# Patient Record
Sex: FEMALE | Race: WHITE | Employment: STUDENT | URBAN - METROPOLITAN AREA
[De-identification: names, ages, dates, MRNs, and addresses within clinical notes are randomized per-mention and may not be internally consistent; named-entity substitution may affect disease eponyms.]

---
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
## 2019-12-18 PROBLEM — S73.192A TEAR OF LEFT ACETABULAR LABRUM: Status: ACTIVE | Noted: 2019-12-18

## 2019-12-23 PROBLEM — R87.610 ATYPICAL SQUAMOUS CELL CHANGES OF UNDETERMINED SIGNIFICANCE (ASCUS) ON CERVICAL CYTOLOGY WITH NEGATIVE HIGH RISK HUMAN PAPILLOMA VIRUS (HPV) TEST RESULT: Status: ACTIVE | Noted: 2019-12-23

## 2020-01-20 ENCOUNTER — APPOINTMENT (OUTPATIENT)
Dept: GENERAL RADIOLOGY | Age: 20
End: 2020-01-20
Attending: FAMILY MEDICINE
Payer: COMMERCIAL

## 2020-01-20 ENCOUNTER — HOSPITAL ENCOUNTER (OUTPATIENT)
Age: 20
Discharge: HOME | End: 2020-01-20
Attending: FAMILY MEDICINE
Payer: COMMERCIAL

## 2020-01-20 VITALS
HEART RATE: 91 BPM | TEMPERATURE: 99 F | OXYGEN SATURATION: 98 % | DIASTOLIC BLOOD PRESSURE: 86 MMHG | RESPIRATION RATE: 16 BRPM | SYSTOLIC BLOOD PRESSURE: 116 MMHG

## 2020-01-20 DIAGNOSIS — S93.602A SPRAIN OF LEFT FOOT, INITIAL ENCOUNTER: Primary | ICD-10-CM

## 2020-01-20 PROCEDURE — 99203 OFFICE O/P NEW LOW 30 MIN: CPT

## 2020-01-20 PROCEDURE — 73630 X-RAY EXAM OF FOOT: CPT | Performed by: FAMILY MEDICINE

## 2020-01-20 PROCEDURE — 99213 OFFICE O/P EST LOW 20 MIN: CPT

## 2020-01-20 NOTE — ED INITIAL ASSESSMENT (HPI)
Pt states at practice today running felt a pull in her outer left foot. States it felt better so she ran again and had sudden severe pain to left foot. Arrived ambulating in a CAM boot that she had for the right foot last year. No swelling noted.

## 2020-01-21 NOTE — ED PROVIDER NOTES
Patient Seen in: [id number] [facility]      History   Patient presents with:   Foot Injury    Stated Complaint: LEFT FOOT INJURY     HPI    24-year-old female presents today for evaluation of left foot injury that she sustained today while runni No proximal tib-fib tenderness. Normal knee flexion and extension.     ED Course   Labs Reviewed - No data to display       Xr Foot, Complete (min 3 Views), Left (cpt=73630)    Result Date: 1/20/2020  PROCEDURE:  XR FOOT, COMPLETE (MIN 3 VIEWS), LEFT (CPT

## 2021-07-28 ENCOUNTER — OFFICE VISIT (OUTPATIENT)
Dept: INTERNAL MEDICINE | Age: 21
End: 2021-07-28

## 2021-07-28 ENCOUNTER — LAB SERVICES (OUTPATIENT)
Dept: LAB | Age: 21
End: 2021-07-28

## 2021-07-28 VITALS
SYSTOLIC BLOOD PRESSURE: 102 MMHG | DIASTOLIC BLOOD PRESSURE: 64 MMHG | HEIGHT: 62 IN | TEMPERATURE: 97.4 F | HEART RATE: 76 BPM | RESPIRATION RATE: 16 BRPM | OXYGEN SATURATION: 99 % | BODY MASS INDEX: 28.34 KG/M2 | WEIGHT: 154 LBS

## 2021-07-28 DIAGNOSIS — D17.20 LIPOMA OF UPPER EXTREMITY, UNSPECIFIED LATERALITY: ICD-10-CM

## 2021-07-28 DIAGNOSIS — R63.5 WEIGHT GAIN: ICD-10-CM

## 2021-07-28 DIAGNOSIS — R14.0 ABDOMINAL BLOATING: ICD-10-CM

## 2021-07-28 DIAGNOSIS — K52.9 CHRONIC DIARRHEA: Primary | ICD-10-CM

## 2021-07-28 DIAGNOSIS — K52.9 CHRONIC DIARRHEA: ICD-10-CM

## 2021-07-28 PROBLEM — S73.192A TEAR OF LEFT ACETABULAR LABRUM: Status: ACTIVE | Noted: 2019-12-18

## 2021-07-28 PROBLEM — R87.610 ATYPICAL SQUAMOUS CELL CHANGES OF UNDETERMINED SIGNIFICANCE (ASCUS) ON CERVICAL CYTOLOGY WITH NEGATIVE HIGH RISK HUMAN PAPILLOMA VIRUS (HPV) TEST RESULT: Status: ACTIVE | Noted: 2019-12-23

## 2021-07-28 LAB
ALBUMIN SERPL-MCNC: 4.2 G/DL (ref 3.6–5.1)
ALP SERPL-CCNC: 63 U/L (ref 45–130)
ALT SERPL W/O P-5'-P-CCNC: 18 U/L (ref 4–38)
AST SERPL-CCNC: 28 U/L (ref 14–43)
BASOPHIL %: 0.7 % (ref 0–1.2)
BASOPHIL ABSOLUTE #: 0.1 10*3/UL (ref 0–0.1)
BILIRUB SERPL-MCNC: 0.5 MG/DL (ref 0–1.3)
BUN SERPL-MCNC: 16 MG/DL (ref 7–20)
CALCIUM SERPL-MCNC: 9.5 MG/DL (ref 8.6–10.6)
CHLORIDE SERPL-SCNC: 105 MMOL/L (ref 96–107)
CO2 SERPL-SCNC: 26 MMOL/L (ref 22–32)
CREAT SERPL-MCNC: 0.7 MG/DL (ref 0.5–1.4)
DIFFERENTIAL TYPE: NORMAL
EOSINOPHIL %: 1.3 % (ref 0–10)
EOSINOPHIL ABSOLUTE #: 0.1 10*3/UL (ref 0–0.5)
GFR SERPL CREATININE-BSD FRML MDRD: >60 ML/MIN/{1.73M2}
GFR SERPL CREATININE-BSD FRML MDRD: >60 ML/MIN/{1.73M2}
GLUCOSE SERPL-MCNC: 83 MG/DL (ref 70–200)
HEMATOCRIT: 38.3 % (ref 34–45)
HEMOGLOBIN: 12.3 G/DL (ref 11.2–15.7)
IMMATURE GRANULOCYTE ABSOLUTE: 0.02 10*3/UL (ref 0–0.05)
IMMATURE GRANULOCYTE PERCENT: 0.3 % (ref 0–0.5)
LIPASE SERPL-CCNC: 140 U/L (ref 10–250)
LYMPH PERCENT: 29.3 % (ref 20.5–51.1)
LYMPHOCYTE ABSOLUTE #: 2.1 10*3/UL (ref 1.2–3.4)
MEAN CORPUSCULAR HGB CONCENTRATION: 32.1 % (ref 32–36)
MEAN CORPUSCULAR HGB: 30.1 PG (ref 27–34)
MEAN CORPUSCULAR VOLUME: 93.9 FL (ref 79–95)
MEAN PLATELET VOLUME: 10.1 FL (ref 8.6–12.4)
MONOCYTE ABSOLUTE #: 0.6 10*3/UL (ref 0.2–0.9)
MONOCYTE PERCENT: 7.8 % (ref 4.3–12.9)
NEUTROPHIL ABSOLUTE #: 4.4 10*3/UL (ref 1.4–6.5)
NEUTROPHIL PERCENT: 60.6 % (ref 34–73.5)
PLATELET COUNT: 316 10*3/UL (ref 150–400)
POTASSIUM SERPL-SCNC: 4.1 MMOL/L (ref 3.5–5.3)
PROT SERPL-MCNC: 7 G/DL (ref 6.4–8.5)
RED BLOOD CELL COUNT: 4.08 10*6/UL (ref 3.7–5.2)
RED CELL DISTRIBUTION WIDTH: 12.1 % (ref 11.3–14.8)
SODIUM SERPL-SCNC: 139 MMOL/L (ref 136–146)
T4 FREE SERPL-MCNC: 0.95 NG/DL (ref 0.78–2.19)
TSH SERPL DL<=0.05 MIU/L-ACNC: 2.67 M[IU]/L (ref 0.3–4.82)
WHITE BLOOD CELL COUNT: 7.2 10*3/UL (ref 4–10)

## 2021-07-28 PROCEDURE — 84439 ASSAY OF FREE THYROXINE: CPT | Performed by: INTERNAL MEDICINE

## 2021-07-28 PROCEDURE — 36415 COLL VENOUS BLD VENIPUNCTURE: CPT | Performed by: INTERNAL MEDICINE

## 2021-07-28 PROCEDURE — 99204 OFFICE O/P NEW MOD 45 MIN: CPT | Performed by: INTERNAL MEDICINE

## 2021-07-28 PROCEDURE — 85025 COMPLETE CBC W/AUTO DIFF WBC: CPT | Performed by: INTERNAL MEDICINE

## 2021-07-28 PROCEDURE — 83516 IMMUNOASSAY NONANTIBODY: CPT | Performed by: INTERNAL MEDICINE

## 2021-07-28 PROCEDURE — 83690 ASSAY OF LIPASE: CPT | Performed by: INTERNAL MEDICINE

## 2021-07-28 PROCEDURE — 80053 COMPREHEN METABOLIC PANEL: CPT | Performed by: INTERNAL MEDICINE

## 2021-07-28 PROCEDURE — 84443 ASSAY THYROID STIM HORMONE: CPT | Performed by: INTERNAL MEDICINE

## 2021-07-28 ASSESSMENT — PATIENT HEALTH QUESTIONNAIRE - PHQ9
CLINICAL INTERPRETATION OF PHQ2 SCORE: NO FURTHER SCREENING NEEDED
2. FEELING DOWN, DEPRESSED OR HOPELESS: NOT AT ALL
CLINICAL INTERPRETATION OF PHQ9 SCORE: NO FURTHER SCREENING NEEDED
SUM OF ALL RESPONSES TO PHQ9 QUESTIONS 1 AND 2: 0
1. LITTLE INTEREST OR PLEASURE IN DOING THINGS: NOT AT ALL
SUM OF ALL RESPONSES TO PHQ9 QUESTIONS 1 AND 2: 0

## 2021-07-28 ASSESSMENT — PAIN SCALES - GENERAL: PAINLEVEL: 3

## 2021-07-29 LAB
GLIADIN PEPTIDE IGA SER IA-ACNC: 1.3 U/ML (ref 0–7)
GLIADIN PEPTIDE IGG SER IA-ACNC: 0.6 U/ML (ref 0–7)
TTG IGA SER IA-ACNC: 0.3 U/ML (ref 0–7)
TTG IGG SER IA-ACNC: <0.6 U/ML (ref 0–7)

## 2021-07-30 ENCOUNTER — APPOINTMENT (OUTPATIENT)
Dept: FAMILY MEDICINE | Age: 21
End: 2021-07-30

## 2021-07-31 ENCOUNTER — LAB SERVICES (OUTPATIENT)
Dept: LAB | Age: 21
End: 2021-07-31

## 2021-07-31 DIAGNOSIS — R63.5 WEIGHT GAIN: ICD-10-CM

## 2021-07-31 DIAGNOSIS — K52.9 CHRONIC DIARRHEA: ICD-10-CM

## 2021-07-31 DIAGNOSIS — R14.0 ABDOMINAL BLOATING: ICD-10-CM

## 2021-07-31 PROCEDURE — 87338 HPYLORI STOOL AG IA: CPT | Performed by: INTERNAL MEDICINE

## 2021-08-02 LAB — H PYLORI STOOL ANTIGEN: NEGATIVE

## 2021-08-16 ENCOUNTER — IMAGING SERVICES (OUTPATIENT)
Dept: GENERAL RADIOLOGY | Age: 21
End: 2021-08-16
Attending: INTERNAL MEDICINE

## 2021-08-16 ENCOUNTER — OFFICE VISIT (OUTPATIENT)
Dept: GASTROENTEROLOGY | Age: 21
End: 2021-08-16

## 2021-08-16 VITALS — BODY MASS INDEX: 29.11 KG/M2 | WEIGHT: 158.2 LBS | HEIGHT: 62 IN

## 2021-08-16 DIAGNOSIS — R14.0 ABDOMINAL BLOATING: ICD-10-CM

## 2021-08-16 DIAGNOSIS — K52.9 CHRONIC DIARRHEA: ICD-10-CM

## 2021-08-16 DIAGNOSIS — R14.0 BLOATING: ICD-10-CM

## 2021-08-16 DIAGNOSIS — R19.4 ALTERED BOWEL HABITS: ICD-10-CM

## 2021-08-16 DIAGNOSIS — R19.4 ALTERED BOWEL HABITS: Primary | ICD-10-CM

## 2021-08-16 PROCEDURE — 74018 RADEX ABDOMEN 1 VIEW: CPT | Performed by: RADIOLOGY

## 2021-08-16 PROCEDURE — 99244 OFF/OP CNSLTJ NEW/EST MOD 40: CPT | Performed by: INTERNAL MEDICINE

## 2021-09-24 ENCOUNTER — APPOINTMENT (OUTPATIENT)
Dept: GASTROENTEROLOGY | Age: 21
End: 2021-09-24
Attending: INTERNAL MEDICINE

## 2022-05-09 ENCOUNTER — OFFICE VISIT (OUTPATIENT)
Dept: INTERNAL MEDICINE CLINIC | Facility: CLINIC | Age: 22
End: 2022-05-09
Payer: COMMERCIAL

## 2022-05-09 VITALS
HEIGHT: 63.78 IN | BODY MASS INDEX: 24.37 KG/M2 | TEMPERATURE: 98 F | WEIGHT: 141 LBS | HEART RATE: 64 BPM | RESPIRATION RATE: 16 BRPM | OXYGEN SATURATION: 97 % | DIASTOLIC BLOOD PRESSURE: 54 MMHG | SYSTOLIC BLOOD PRESSURE: 96 MMHG

## 2022-05-09 DIAGNOSIS — S73.192S TEAR OF LEFT ACETABULAR LABRUM, SEQUELA: Primary | ICD-10-CM

## 2022-05-17 ENCOUNTER — HOSPITAL ENCOUNTER (OUTPATIENT)
Dept: GENERAL RADIOLOGY | Age: 22
Discharge: HOME | End: 2022-05-17
Attending: INTERNAL MEDICINE
Payer: COMMERCIAL

## 2022-05-17 ENCOUNTER — HOSPITAL ENCOUNTER (OUTPATIENT)
Dept: MRI IMAGING | Age: 22
Discharge: HOME | End: 2022-05-17
Attending: INTERNAL MEDICINE
Payer: COMMERCIAL

## 2022-05-17 DIAGNOSIS — S73.192S TEAR OF LEFT ACETABULAR LABRUM, SEQUELA: ICD-10-CM

## 2022-05-17 PROCEDURE — A9575 INJ GADOTERATE MEGLUMI 0.1ML: HCPCS | Performed by: INTERNAL MEDICINE

## 2022-05-17 PROCEDURE — 27093 INJECTION FOR HIP X-RAY: CPT | Performed by: INTERNAL MEDICINE

## 2022-05-17 PROCEDURE — 73722 MRI JOINT OF LWR EXTR W/DYE: CPT | Performed by: INTERNAL MEDICINE

## 2022-05-17 PROCEDURE — 77002 NEEDLE LOCALIZATION BY XRAY: CPT | Performed by: INTERNAL MEDICINE

## 2022-10-17 ENCOUNTER — TELEPHONE (OUTPATIENT)
Dept: INTERNAL MEDICINE CLINIC | Facility: CLINIC | Age: 22
End: 2022-10-17

## 2022-10-17 DIAGNOSIS — Z13.220 SCREENING, LIPID: ICD-10-CM

## 2022-10-17 DIAGNOSIS — Z00.00 ROUTINE GENERAL MEDICAL EXAMINATION AT A HEALTH CARE FACILITY: Primary | ICD-10-CM

## 2022-10-17 DIAGNOSIS — Z13.29 SCREENING FOR THYROID DISORDER: ICD-10-CM

## 2022-10-17 DIAGNOSIS — Z13.228 SCREENING FOR METABOLIC DISORDER: ICD-10-CM

## 2022-10-17 DIAGNOSIS — Z13.0 SCREENING FOR BLOOD DISEASE: ICD-10-CM

## 2022-10-17 NOTE — TELEPHONE ENCOUNTER
Orders to THE [facility] Pt aware to get labs done no sooner than 2 weeks prior to the appt. Pt aware to fast.  No call back required.   Future Appointments   Date Time Provider Starr Carrasco   11/23/2022 10:00 AM Santiago Martinez MD [facility]

## 2022-10-26 NOTE — TELEPHONE ENCOUNTER
Bloodwork orders placed to THE [facility] lab for upcoming physical per protocol.

## 2022-11-16 ENCOUNTER — LABORATORY ENCOUNTER (OUTPATIENT)
Dept: LAB | Age: 22
End: 2022-11-16
Attending: INTERNAL MEDICINE
Payer: COMMERCIAL

## 2022-11-16 DIAGNOSIS — Z13.220 SCREENING, LIPID: ICD-10-CM

## 2022-11-16 DIAGNOSIS — Z13.29 SCREENING FOR THYROID DISORDER: ICD-10-CM

## 2022-11-16 DIAGNOSIS — Z13.228 SCREENING FOR METABOLIC DISORDER: ICD-10-CM

## 2022-11-16 DIAGNOSIS — Z13.0 SCREENING FOR BLOOD DISEASE: ICD-10-CM

## 2022-11-16 DIAGNOSIS — Z00.00 ROUTINE GENERAL MEDICAL EXAMINATION AT A HEALTH CARE FACILITY: ICD-10-CM

## 2022-11-16 LAB
ALBUMIN SERPL-MCNC: 4.1 G/DL (ref 3.4–5)
ALBUMIN/GLOB SERPL: 1.2 {RATIO} (ref 1–2)
ALP LIVER SERPL-CCNC: 55 U/L
ALT SERPL-CCNC: 17 U/L
ANION GAP SERPL CALC-SCNC: 3 MMOL/L (ref 0–18)
AST SERPL-CCNC: 12 U/L (ref 15–37)
BASOPHILS # BLD AUTO: 0.05 X10(3) UL (ref 0–0.2)
BASOPHILS NFR BLD AUTO: 0.7 %
BILIRUB SERPL-MCNC: 0.4 MG/DL (ref 0.1–2)
BUN BLD-MCNC: 15 MG/DL (ref 7–18)
CALCIUM BLD-MCNC: 9.2 MG/DL (ref 8.5–10.1)
CHLORIDE SERPL-SCNC: 110 MMOL/L (ref 98–112)
CHOLEST SERPL-MCNC: 151 MG/DL (ref ?–200)
CO2 SERPL-SCNC: 25 MMOL/L (ref 21–32)
CREAT BLD-MCNC: 0.8 MG/DL
EOSINOPHIL # BLD AUTO: 0.17 X10(3) UL (ref 0–0.7)
EOSINOPHIL NFR BLD AUTO: 2.3 %
ERYTHROCYTE [DISTWIDTH] IN BLOOD BY AUTOMATED COUNT: 11.9 %
FASTING PATIENT LIPID ANSWER: YES
FASTING STATUS PATIENT QL REPORTED: YES
GFR SERPLBLD BASED ON 1.73 SQ M-ARVRAT: 105 ML/MIN/1.73M2 (ref 60–?)
GLOBULIN PLAS-MCNC: 3.4 G/DL (ref 2.8–4.4)
GLUCOSE BLD-MCNC: 89 MG/DL (ref 70–99)
HCT VFR BLD AUTO: 43.1 %
HDLC SERPL-MCNC: 55 MG/DL (ref 40–59)
HGB BLD-MCNC: 14.2 G/DL
IMM GRANULOCYTES # BLD AUTO: 0.01 X10(3) UL (ref 0–1)
IMM GRANULOCYTES NFR BLD: 0.1 %
LDLC SERPL CALC-MCNC: 78 MG/DL (ref ?–100)
LYMPHOCYTES # BLD AUTO: 3.08 X10(3) UL (ref 1–4)
LYMPHOCYTES NFR BLD AUTO: 40.8 %
MCH RBC QN AUTO: 30.4 PG (ref 26–34)
MCHC RBC AUTO-ENTMCNC: 32.9 G/DL (ref 31–37)
MCV RBC AUTO: 92.3 FL
MONOCYTES # BLD AUTO: 0.69 X10(3) UL (ref 0.1–1)
MONOCYTES NFR BLD AUTO: 9.1 %
NEUTROPHILS # BLD AUTO: 3.55 X10 (3) UL (ref 1.5–7.7)
NEUTROPHILS # BLD AUTO: 3.55 X10(3) UL (ref 1.5–7.7)
NEUTROPHILS NFR BLD AUTO: 47 %
NONHDLC SERPL-MCNC: 96 MG/DL (ref ?–130)
OSMOLALITY SERPL CALC.SUM OF ELEC: 286 MOSM/KG (ref 275–295)
PLATELET # BLD AUTO: 305 10(3)UL (ref 150–450)
POTASSIUM SERPL-SCNC: 3.9 MMOL/L (ref 3.5–5.1)
PROT SERPL-MCNC: 7.5 G/DL (ref 6.4–8.2)
RBC # BLD AUTO: 4.67 X10(6)UL
SODIUM SERPL-SCNC: 138 MMOL/L (ref 136–145)
TRIGL SERPL-MCNC: 99 MG/DL (ref 30–149)
TSI SER-ACNC: 3.71 MIU/ML (ref 0.36–3.74)
VLDLC SERPL CALC-MCNC: 15 MG/DL (ref 0–30)
WBC # BLD AUTO: 7.6 X10(3) UL (ref 4–11)

## 2022-11-16 PROCEDURE — 84443 ASSAY THYROID STIM HORMONE: CPT

## 2022-11-16 PROCEDURE — 85025 COMPLETE CBC W/AUTO DIFF WBC: CPT

## 2022-11-16 PROCEDURE — 80061 LIPID PANEL: CPT

## 2022-11-16 PROCEDURE — 36415 COLL VENOUS BLD VENIPUNCTURE: CPT

## 2022-11-16 PROCEDURE — 80053 COMPREHEN METABOLIC PANEL: CPT

## 2022-11-23 ENCOUNTER — OFFICE VISIT (OUTPATIENT)
Dept: INTERNAL MEDICINE CLINIC | Facility: CLINIC | Age: 22
End: 2022-11-23
Payer: COMMERCIAL

## 2022-11-23 VITALS
SYSTOLIC BLOOD PRESSURE: 108 MMHG | HEIGHT: 64 IN | BODY MASS INDEX: 25.61 KG/M2 | HEART RATE: 76 BPM | DIASTOLIC BLOOD PRESSURE: 64 MMHG | OXYGEN SATURATION: 98 % | WEIGHT: 150 LBS | RESPIRATION RATE: 16 BRPM | TEMPERATURE: 99 F

## 2022-11-23 DIAGNOSIS — F41.9 ANXIETY: ICD-10-CM

## 2022-11-23 DIAGNOSIS — S73.192S TEAR OF LEFT ACETABULAR LABRUM, SEQUELA: ICD-10-CM

## 2022-11-23 DIAGNOSIS — Z00.00 ROUTINE GENERAL MEDICAL EXAMINATION AT A HEALTH CARE FACILITY: Primary | ICD-10-CM

## 2022-11-23 PROCEDURE — 99395 PREV VISIT EST AGE 18-39: CPT | Performed by: INTERNAL MEDICINE

## 2022-11-23 PROCEDURE — 3008F BODY MASS INDEX DOCD: CPT | Performed by: INTERNAL MEDICINE

## 2022-11-23 PROCEDURE — 3078F DIAST BP <80 MM HG: CPT | Performed by: INTERNAL MEDICINE

## 2022-11-23 PROCEDURE — 3074F SYST BP LT 130 MM HG: CPT | Performed by: INTERNAL MEDICINE

## 2022-11-23 RX ORDER — DICYCLOMINE HYDROCHLORIDE 10 MG/1
10 CAPSULE ORAL 4 TIMES DAILY
Qty: 40 CAPSULE | Refills: 0 | Status: SHIPPED | OUTPATIENT
Start: 2022-11-23 | End: 2022-12-03

## 2023-04-24 ENCOUNTER — OFFICE VISIT (OUTPATIENT)
Dept: FAMILY MEDICINE CLINIC | Facility: CLINIC | Age: 23
End: 2023-04-24
Payer: COMMERCIAL

## 2023-04-24 VITALS
WEIGHT: 145 LBS | BODY MASS INDEX: 25.69 KG/M2 | OXYGEN SATURATION: 98 % | DIASTOLIC BLOOD PRESSURE: 76 MMHG | RESPIRATION RATE: 18 BRPM | HEART RATE: 64 BPM | SYSTOLIC BLOOD PRESSURE: 114 MMHG | HEIGHT: 63 IN | TEMPERATURE: 97 F

## 2023-04-24 DIAGNOSIS — H81.12 BENIGN POSITIONAL VERTIGO, LEFT: Primary | ICD-10-CM

## 2023-04-24 RX ORDER — PREDNISONE 20 MG/1
TABLET ORAL
Qty: 10 TABLET | Refills: 0 | Status: SHIPPED | OUTPATIENT
Start: 2023-04-24

## 2023-04-24 RX ORDER — MECLIZINE HYDROCHLORIDE 25 MG/1
25 TABLET ORAL 3 TIMES DAILY PRN
Qty: 30 TABLET | Refills: 0 | Status: SHIPPED | OUTPATIENT
Start: 2023-04-24

## 2023-05-04 ENCOUNTER — OFFICE VISIT (OUTPATIENT)
Dept: INTERNAL MEDICINE CLINIC | Facility: CLINIC | Age: 23
End: 2023-05-04
Payer: COMMERCIAL

## 2023-05-04 VITALS
RESPIRATION RATE: 21 BRPM | HEIGHT: 63 IN | HEART RATE: 81 BPM | TEMPERATURE: 97 F | DIASTOLIC BLOOD PRESSURE: 66 MMHG | BODY MASS INDEX: 26.69 KG/M2 | WEIGHT: 150.63 LBS | OXYGEN SATURATION: 99 % | SYSTOLIC BLOOD PRESSURE: 112 MMHG

## 2023-05-04 DIAGNOSIS — M25.551 CHRONIC HIP PAIN, BILATERAL: ICD-10-CM

## 2023-05-04 DIAGNOSIS — H81.12 BPPV (BENIGN PAROXYSMAL POSITIONAL VERTIGO), LEFT: Primary | ICD-10-CM

## 2023-05-04 DIAGNOSIS — F41.9 ANXIETY: ICD-10-CM

## 2023-05-04 DIAGNOSIS — M25.552 CHRONIC HIP PAIN, BILATERAL: ICD-10-CM

## 2023-05-04 DIAGNOSIS — G89.29 CHRONIC HIP PAIN, BILATERAL: ICD-10-CM

## 2023-05-04 PROCEDURE — 90715 TDAP VACCINE 7 YRS/> IM: CPT | Performed by: INTERNAL MEDICINE

## 2023-05-04 PROCEDURE — 3008F BODY MASS INDEX DOCD: CPT | Performed by: INTERNAL MEDICINE

## 2023-05-04 PROCEDURE — 90471 IMMUNIZATION ADMIN: CPT | Performed by: INTERNAL MEDICINE

## 2023-05-04 PROCEDURE — 3074F SYST BP LT 130 MM HG: CPT | Performed by: INTERNAL MEDICINE

## 2023-05-04 PROCEDURE — 3078F DIAST BP <80 MM HG: CPT | Performed by: INTERNAL MEDICINE

## 2023-05-04 PROCEDURE — 99214 OFFICE O/P EST MOD 30 MIN: CPT | Performed by: INTERNAL MEDICINE

## 2023-05-04 RX ORDER — MULTIVITAMIN
1 TABLET ORAL DAILY
COMMUNITY

## 2023-06-01 ENCOUNTER — E-VISIT (OUTPATIENT)
Dept: TELEHEALTH | Age: 23
End: 2023-06-01

## 2023-06-01 DIAGNOSIS — J01.00 ACUTE MAXILLARY SINUSITIS, RECURRENCE NOT SPECIFIED: Primary | ICD-10-CM

## 2023-06-01 PROCEDURE — 99421 OL DIG E/M SVC 5-10 MIN: CPT | Performed by: NURSE PRACTITIONER

## 2023-06-01 RX ORDER — AMOXICILLIN AND CLAVULANATE POTASSIUM 875; 125 MG/1; MG/1
1 TABLET, FILM COATED ORAL 2 TIMES DAILY WITH MEALS
Qty: 14 TABLET | Refills: 0 | Status: SHIPPED | OUTPATIENT
Start: 2023-06-01 | End: 2023-06-08

## 2023-06-01 RX ORDER — FLUTICASONE PROPIONATE 50 MCG
2 SPRAY, SUSPENSION (ML) NASAL DAILY
Qty: 1 EACH | Refills: 0 | Status: SHIPPED | OUTPATIENT
Start: 2023-06-01

## 2023-06-24 ENCOUNTER — TELEPHONE (OUTPATIENT)
Dept: INTERNAL MEDICINE CLINIC | Facility: CLINIC | Age: 23
End: 2023-06-24

## 2023-06-24 DIAGNOSIS — Z13.228 SCREENING FOR METABOLIC DISORDER: ICD-10-CM

## 2023-06-24 DIAGNOSIS — Z13.0 SCREENING FOR BLOOD DISEASE: ICD-10-CM

## 2023-06-24 DIAGNOSIS — Z00.00 ROUTINE GENERAL MEDICAL EXAMINATION AT A HEALTH CARE FACILITY: ICD-10-CM

## 2023-06-24 DIAGNOSIS — Z13.220 SCREENING, LIPID: ICD-10-CM

## 2023-06-24 DIAGNOSIS — Z13.29 SCREENING FOR THYROID DISORDER: Primary | ICD-10-CM

## 2023-06-24 NOTE — TELEPHONE ENCOUNTER
Future Appointments   Date Time Provider Starr Carrasco   12/1/2023  9:20 AM Pablo Pena MD [facility]         Informed must fast no call back required.  Orders to Jackie Paul

## 2023-08-18 ENCOUNTER — NURSE ONLY (OUTPATIENT)
Dept: LAB | Age: 23
End: 2023-08-18
Attending: NURSE PRACTITIONER
Payer: COMMERCIAL

## 2023-08-18 ENCOUNTER — TELEMEDICINE (OUTPATIENT)
Dept: INTERNAL MEDICINE CLINIC | Facility: CLINIC | Age: 23
End: 2023-08-18

## 2023-08-18 VITALS — BODY MASS INDEX: 29.81 KG/M2 | WEIGHT: 162 LBS | HEIGHT: 62 IN

## 2023-08-18 DIAGNOSIS — E66.3 OVERWEIGHT (BMI 25.0-29.9): ICD-10-CM

## 2023-08-18 DIAGNOSIS — F41.9 ANXIETY: ICD-10-CM

## 2023-08-18 DIAGNOSIS — Z51.81 THERAPEUTIC DRUG MONITORING: ICD-10-CM

## 2023-08-18 DIAGNOSIS — Z51.81 THERAPEUTIC DRUG MONITORING: Primary | ICD-10-CM

## 2023-08-18 PROCEDURE — 99213 OFFICE O/P EST LOW 20 MIN: CPT | Performed by: NURSE PRACTITIONER

## 2023-08-18 PROCEDURE — 93005 ELECTROCARDIOGRAM TRACING: CPT

## 2023-08-18 PROCEDURE — 93010 ELECTROCARDIOGRAM REPORT: CPT | Performed by: INTERNAL MEDICINE

## 2023-08-18 NOTE — PATIENT INSTRUCTIONS
Welcome to the [facility] Program!!  Thank you for placing your trust in our health care team, I look forward to working with you along this journey to better health! Next steps:     1.  Schedule a personal nutrition consultation with one of our registered dieticians. Bring along your food journal (3 days minimum). See journal options below. 2.  get EKG done     Please try to work on the following dietary changes this first month:  Daily protein recommendation to start:  grams  Daily carbohydrate: <105g   Daily calories: 1,200-1,300  1. Drink water with meals and throughout the day, cut down on soda and/or juice if consumed. Consider flavored water options like Bubbly, Spindrift, Hint and Salome. 2.  Eat breakfast daily and focus on having protein with each meal, examples include: greek yogurt, cottage cheese, hard boiled egg, whole grain toast with peanut butter. 3.  Reduce refined carbohydrates and sugars which includes items such as sweets, as well as rice, pasta, and bread and make sure to choose whole grain options when having them with just 1 serving per meal about the size of your inner palm. 4.  Consume non starchy veggies daily working towards making them a good 50% of your daily food intake. Add them to lunch and dinner consistently. 5.  Optional can start a daily probiotic: VSL#3, (order on line at www.vsl3. com). Take 1 capsule daily with water for 30 days, then reduce to 1 every other day (this will reduce the cost). Capsules can be left out for 2 weeks, but then must be refrigerated. Please download elizabeth My Fitness Yara Miner Or Net Diary to monitor daily dietary intake and you will be able to see if you are eating the right amount of calories or too much or too little which would hinder weight loss. Additionally this will help to see your daily carbohydrate and protein intake.  When you set the elizabeth up choose 1-2 lbs/week as a goal.  Keeping a paper food journal is an option as well to remain accountable for your choices- this is the start to mindful eating! A low calorie diet has been consistently shown to support weight loss. Continue or start exercising to help establish a routine. If not already exercising begin with 1 day and progress as able with long-term goal of 30 minutes 5 days a week at a minimum. Meditation daily can help manage and control stress. Chronic stress can make weight loss difficult. Exercising is one way to help with stress, but meditation using the CALM Felipe or another comparable alternative can be done in your home or place of work with little time commitment. This Felipe can also help work on behavior change and improve sleep. Check out the segment under Calm Masterclass and listen to The 4 Pillars of Health. A great way to begin learning about the foundation of lifestyle with practical tips to use in your every day. Check out www.yourweightmatters. org blog for continued daily support and education along this weight loss journey! Patient Resources:     Personal Training/Fitness Classes/Health Coaching     Barbara Bradley and Marcelo Sophiaside @ [email or web address] Full fitness center with group fitness and personal training. Discount available as client of [facility]. Health Coaching and Personal Training with Josi Smalls at our [facility]- individual weekly coaching with option to add personal training and small group fitness classes targeted at weight loss- [phone] and/or email @ [email or web address]  360FIT Lory [email or web address] Group Fitness [phone] and/or email Tete Milan at [email or web address]  [address] with multiple locations: Aetna (www.Defense Mobile), Innotrieve The Preventsys Fitness (www.Stayfilm. [employer]), Tred (www.Carbon Ads. [employer]oph[employer]), The Exercise  (www.exercisecoach.Interviu Me)     Online Fitness  Fitness  on [employer] in 10 DVD series- www. uykki40LRO. Interviu Me  Sit and Be Fit - Chair exercise series Www.sitandbefit. org  Hip Hop Fit with Josias Yang at www.hiphopfit. net     Apps for on the Keepy 7 Minute Workout (orange box with white 7) - free on the go HIIT training felipe  Peloton Felipe @ wwwTexan Hosting     Nutrition Trackers and Tools  LoseIT! And My Fitness Pal apps and on line for tracking nutrition  NOOM - virtual health coaching  FitFoundation (healthy meals on the go) in [employer] @ www. mkqvolxycvytx6j. Benita Ott MD @ wwwFastnet Oil and Gas and Royer Mitchell (keto and low carb plans recommended) @ www. MIUATN52.ZHX, Metabolic Meals @ www. MyMetabolicMeals. com - individual prepared meals to go  Avenda Systems, Novasentis, International Business Machines, Every Plate, MediaSite- on line meal delivery programs for preparation at home  AK Zopa in [place] for homemade meals to go @ wwwNanalysis. Interviu Me  Diet Doctor @ www. dietdoctor. com - low carb swaps  YuHealthLok - meal prep and planning felipe (www.yummly. com)     Stress Management/Behavior/Mindful Eating  CALM meditation felipe (www.calmVertica Systems)  Headspace  Am I Hungry? Mindful eating virtual  felipe  Www.yourweightmatters. org - Obesity Action Coalition sponsored Blog posts daily  Motivation felipe (black box with white \")- daily supportive messages sent to your phone     Books/Video Education/Podcasts  Mindless Eating by Kaitlin Cristina  Why We Get Sick by Arianna Roca (a book about insulin resistance)  Atomic Habits by Ben Parker (a book about taking small steps to promote greater behavior change)   Can't Hurt Me by Patito Novoa (a book exploring the power of discipline in achieving your goals)  The End of Dieting: How to Live for Life by Dr. Omari Mars M.D. or listen to The [address] Episode 61: Understanding \"Nutritarian\" Eating w/Dr. Omari Mars  Your Body in Balance:  The World [employer] Corporation of Food, Hormones, and Health by  Gloria Leigh  The Menopause Diet Plan by Josias Kaufman and [facility] - A [facility] AT [facility]  The Complete Guide to fasting by Dr. Shekhar Thompson, [address] [facility] by Eleazar Layton, Ph.D, R.D. Weight Loss Surgery Will Not Treat Food Addiction by Quirino Godinez Ph.D  The [address] [place] [address] on plant based nutrition  Fed Up - documentary about obesity (Free on [facility])  The Truth About Sugar - documentary on sugar (Free on ForwardMetrics, https://youWest World Media. be/5E1pkxjZM2k)  The Dr. Cyndy Barker by Dr. Caprice Simmonds, MD  Fitlosophy Fitspiration - journal to better health (found at [employer] in fitness aisle)  What Happened to You?- a look at the impact trauma has on behavior written by Jess Aquino and Dr. Uri Luis Again by [facility] - discovering your true self after trauma  Yue Cobos talk on FundedByMe, The Call to Courage  Podcasts: The Exam Room by the Physician's Committee, Nutrition Facts by Dr. Love Other    We are here to support you with weight loss, but please remember that you still need your primary care provider for your routine health maintenance.

## 2023-08-19 LAB
ATRIAL RATE: 69 BPM
P AXIS: 4 DEGREES
P-R INTERVAL: 112 MS
Q-T INTERVAL: 392 MS
QRS DURATION: 80 MS
QTC CALCULATION (BEZET): 420 MS
R AXIS: 36 DEGREES
T AXIS: 33 DEGREES
VENTRICULAR RATE: 69 BPM

## 2023-08-22 ENCOUNTER — PATIENT MESSAGE (OUTPATIENT)
Dept: INTERNAL MEDICINE CLINIC | Facility: CLINIC | Age: 23
End: 2023-08-22

## 2023-08-22 DIAGNOSIS — E66.3 OVERWEIGHT (BMI 25.0-29.9): Primary | ICD-10-CM

## 2023-08-22 RX ORDER — PHENTERMINE HYDROCHLORIDE 15 MG/1
15 CAPSULE ORAL EVERY MORNING
Qty: 30 CAPSULE | Refills: 1 | Status: SHIPPED | OUTPATIENT
Start: 2023-08-22

## 2023-08-22 NOTE — TELEPHONE ENCOUNTER
From: Bonnie Aragon  To: SEPIDEH Cash  Sent: 8/22/2023 8:53 AM CDT  Subject: EKG results/medication    Hi! I saw the message about asking if wanting to go through with the medication. I would like to go ahead and start the medication.

## 2023-10-31 ENCOUNTER — E-VISIT (OUTPATIENT)
Dept: TELEHEALTH | Age: 23
End: 2023-10-31

## 2023-10-31 DIAGNOSIS — J01.40 ACUTE NON-RECURRENT PANSINUSITIS: Primary | ICD-10-CM

## 2023-10-31 DIAGNOSIS — E66.3 OVERWEIGHT (BMI 25.0-29.9): ICD-10-CM

## 2023-10-31 PROCEDURE — 99422 OL DIG E/M SVC 11-20 MIN: CPT | Performed by: NURSE PRACTITIONER

## 2023-10-31 RX ORDER — AMOXICILLIN 875 MG/1
875 TABLET, COATED ORAL 2 TIMES DAILY
Qty: 20 TABLET | Refills: 0 | Status: SHIPPED | OUTPATIENT
Start: 2023-10-31 | End: 2023-11-10

## 2023-11-01 RX ORDER — PHENTERMINE HYDROCHLORIDE 15 MG/1
15 CAPSULE ORAL EVERY MORNING
Qty: 30 CAPSULE | Refills: 0 | Status: SHIPPED | OUTPATIENT
Start: 2023-11-01

## 2023-11-01 NOTE — TELEPHONE ENCOUNTER
Requesting   Requested Prescriptions     Pending Prescriptions Disp Refills    Phentermine HCl 15 MG Oral Cap 30 capsule 1     Sig: Take 1 capsule (15 mg total) by mouth every morning.      LOV: 8/18/23  RTC: not noted  Filled: 8/22/23 #30 with 1 refills    Future Appointments   Date Time Provider Starr Carrasco   12/1/2023  9:20 AM aRul Walters MD [facility]

## 2023-11-01 NOTE — PROGRESS NOTES
Refer to patient Questionnaire and responses. See MyChart messages. 20 minutes spent in direct communication with patient via [address] [facility].

## 2023-11-03 RX ORDER — FLUOXETINE 10 MG/1
10 TABLET, FILM COATED ORAL DAILY
Qty: 30 TABLET | Refills: 0 | OUTPATIENT
Start: 2023-11-03

## 2023-11-03 RX ORDER — FLUOXETINE 10 MG/1
10 TABLET, FILM COATED ORAL DAILY
Qty: 30 TABLET | Refills: 0 | Status: SHIPPED | OUTPATIENT
Start: 2023-11-03

## 2023-11-03 NOTE — TELEPHONE ENCOUNTER
LOV: 12/1/23  Next OV: 9/21/23 telemedicie  Last Refill:10/2/23  Medication Quantity Refills Start End   FLUoxetine 10 MG Oral Tab 30 tablet 0 10/2/2023    Sig:   Take 1 tablet (10 mg total) by mouth daily. Route:   Oral         Please authorize if acceptable. Thank you!

## 2023-11-28 RX ORDER — FLUOXETINE 10 MG/1
10 TABLET, FILM COATED ORAL DAILY
Qty: 90 TABLET | Refills: 0 | Status: SHIPPED | OUTPATIENT
Start: 2023-11-28

## 2024-01-09 ENCOUNTER — TELEMEDICINE (OUTPATIENT)
Dept: TELEHEALTH | Age: 24
End: 2024-01-09
Payer: COMMERCIAL

## 2024-01-09 DIAGNOSIS — B97.89 VIRAL SINUSITIS: Primary | ICD-10-CM

## 2024-01-09 DIAGNOSIS — J32.9 VIRAL SINUSITIS: Primary | ICD-10-CM

## 2024-01-09 PROCEDURE — 99213 OFFICE O/P EST LOW 20 MIN: CPT | Performed by: PHYSICIAN ASSISTANT

## 2024-01-09 RX ORDER — AMOXICILLIN AND CLAVULANATE POTASSIUM 875; 125 MG/1; MG/1
1 TABLET, FILM COATED ORAL 2 TIMES DAILY
Qty: 14 TABLET | Refills: 0 | Status: SHIPPED | OUTPATIENT
Start: 2024-01-09 | End: 2024-01-16

## 2024-01-09 NOTE — PATIENT INSTRUCTIONS
I advise drinking lots of warm fluids, using steam/humidifier, nasal saline, and pain relievers (ibuprofen, naproxen, acetaminophen) to help with congestion, sinus pressure, headaches and throat pain.  You can also use plain Mucinex to help thin secretions. If you do not have high blood pressure or issues with taking Sudafed, I do recommend taking the behind the pharmacy counter pseudoephedrine 4-6 hour formulation according to package instructions.      Most upper respiratory and/or sinus infections are caused by viruses and are not cured by antibiotics, which is why the focus now should be on managing your symptoms so you can feel less crummy for now.  Research shows that it takes on average 10-14 days for a viral infection to morph into a secondary bacterial infection. With this being said, in the instance your symptoms do not improve over the next 3-5 days, I have sent over an antibiotic, Amox-Clav 1 pill every 12 hours with food for 7 days,

## 2024-01-15 ENCOUNTER — TELEMEDICINE (OUTPATIENT)
Dept: TELEHEALTH | Age: 24
End: 2024-01-15
Payer: COMMERCIAL

## 2024-01-15 DIAGNOSIS — H10.022 MUCOPURULENT CONJUNCTIVITIS OF LEFT EYE: Primary | ICD-10-CM

## 2024-01-15 DIAGNOSIS — R05.9 COUGH, UNSPECIFIED TYPE: ICD-10-CM

## 2024-01-15 PROCEDURE — 99212 OFFICE O/P EST SF 10 MIN: CPT | Performed by: PHYSICIAN ASSISTANT

## 2024-01-15 RX ORDER — FLUOXETINE 10 MG/1
10 TABLET, FILM COATED ORAL DAILY
Qty: 90 TABLET | Refills: 0 | Status: SHIPPED | OUTPATIENT
Start: 2024-01-15

## 2024-01-15 RX ORDER — BENZONATATE 200 MG/1
200 CAPSULE ORAL 3 TIMES DAILY PRN
Qty: 21 CAPSULE | Refills: 0 | Status: SHIPPED | OUTPATIENT
Start: 2024-01-15

## 2024-01-15 RX ORDER — OFLOXACIN 3 MG/ML
1 SOLUTION/ DROPS OPHTHALMIC 4 TIMES DAILY
Qty: 5 ML | Refills: 0 | Status: SHIPPED | OUTPATIENT
Start: 2024-01-15 | End: 2024-01-22

## 2024-01-15 NOTE — PROGRESS NOTES
Virtual/Telephone Check-In    Kristin Rosario verbally consents to a Virtual/Telephone Check-In service on 01/15/24.  Patient has been referred to the [facility] website at [email or web address] to review the yearly Consent to Treat document.  Patient understands and accepts financial responsibility for any deductible, co-insurance and/or co-pays associated with this service.       Telehealth Verbal Consent   I conducted a telehealth visit with Kristin Rosario today, 01/15/24, which was completed using two-way, real-time interactive audio and video communication. This has been done in good jakob to provide continuity of care in the best interest of the provider-patient relationship, due to the COVID - public health crisis/national emergency where restrictions of face-to-face office visits are ongoing. Every conscious effort was taken to allow for sufficient and adequate time to complete the visit.  The patient was made aware of the limitations of the telehealth visit, including treatment limitations as no physical exam could be performed.  The patient was advised to call 911 or to go to the ER in case there was an emergency.  The patient was also advised of the potential privacy & security concerns related to the telehealth platform.   The patient was made aware of where to find [facility]'s notice of privacy practices, telehealth consent form and other related consent forms and documents.  which are located on the [facility] website. The patient verbally agreed to telehealth consent form, related consents and the risks discussed.    Lastly, the patient confirmed that they were in [place].   Included in this visit, time may have been spent reviewing labs, medications, radiology tests and decision making. Appropriate medical decision-making and tests are ordered as detailed in the plan of care above.  Coding/billing information is submitted for this visit based on complexity of care and/or time spent for the visit.    CHIEF  COMPLAINT:  Chief Complaint   Patient presents with    Pink Eye       HPI:  Kristin Rosario is a 23 year old female who presents for a video visit.  Patient reports L eye redness, crusting and discharge this morning. Reports thick yellow purulent discharge with dry sensation. Does not wear contacts. Also reports cough and sinus congestion for 10 days. Still with purulent nasal discharge with some bloody noses as well daily. Denies any sinus pain but has some sinus pressure, no worse today. Was seen through video visit 1 week ago and dx with viral sinusitis but abx Rx sent in to have on hand just incase symptoms worsened.   Patient denies fever, chills, headache, sore throat, blurry vision, double vision, sensitivity to light, pain with eye movement. Was exposed to pink eye in household recently.     Current Outpatient Medications   Medication Sig Dispense Refill    benzonatate 200 MG Oral Cap Take 1 capsule (200 mg total) by mouth 3 (three) times daily as needed for cough. 21 capsule 0    ofloxacin 0.3 % Ophthalmic Solution Place 1 drop into the left eye 4 (four) times daily for 7 days. 5 mL 0    amoxicillin clavulanate 875-125 MG Oral Tab Take 1 tablet by mouth 2 (two) times daily for 7 days. 14 tablet 0    buPROPion  MG Oral Tablet 24 Hr Take 1 tablet (150 mg total) by mouth daily. 90 tablet 0    FLUoxetine 10 MG Oral Tab Take 1 tablet (10 mg total) by mouth daily. 90 tablet 0    Phentermine HCl 15 MG Oral Cap Take 1 capsule (15 mg total) by mouth every morning. 30 capsule 0    Levonorgestrel (KYLEENA IU) by Intrauterine route.       Past Medical History:   Diagnosis Date    Atypical squamous cells of undetermined significance (ASCUS) on Papanicolaou smear of cervix 12/2019    Bursitis     COVID-19 12/18/2021    SEASONAL ALLERGIES      Past Surgical History:   Procedure Laterality Date    ANKLE FRACTURE SURGERY  2016    Retrocalcanila bursitis surgery     IUD KYLEENA  01/14/2020    OTHER  12/2016     retrocalcanial bursitis removal        Social History     Socioeconomic History    Marital status: Single    Number of children: 0   Occupational History    Occupation: student   Tobacco Use    Smoking status: Never    Smokeless tobacco: Never   Vaping Use    Vaping Use: Never used   Substance and Sexual Activity    Alcohol use: Yes     Comment: social    Drug use: No    Sexual activity: Yes     Partners: Male     Birth control/protection: I.U.D.   Other Topics Concern     Service No    Blood Transfusions No    Caffeine Concern No    Occupational Exposure No    Hobby Hazards No    Sleep Concern No    Stress Concern No    Weight Concern No    Special Diet No    Back Care No    Exercise Yes    Bike Helmet No    Seat Belt Yes    Self-Exams No   Social History Narrative    Lives with family        REVIEW OF SYSTEMS:  GENERAL: normal appetite  SKIN: no rashes or abnormal skin lesions  HEENT: See HPI  LUNGS: denies shortness of breath or wheezing, See HPI  CARDIOVASCULAR: denies chest pain or palpitations   GI: denies N/V/C or abdominal pain  NEURO: Denies headaches    EXAM:  General: Alert, Well-appearing, and In no acute distress  Respiratory:   Speaking in full sentences comfortably  Normal work of breathing  No cough during visit  Head: Normocephalic  Eyes: EOMI, L conjunctiva inflamed and reddened, some yellow discharge noted. No eyelid edema. No periorbital redness or swelling noted.  Nose: No obvious nasal discharge.  Skin: No obvious rashes or lesions from what observed.     No results found for this or any previous visit (from the past 24 hour(s)).    ASSESSMENT AND PLAN:  Kristin Rosario is a 23 year old female who presents with symptoms that are consistent with    ASSESSMENT:   Encounter Diagnoses   Name Primary?    Mucopurulent conjunctivitis of left eye Yes    Cough, unspecified type        PLAN: Will start on ofloxacin drops for conjunctivitis, tessalon prn cough. Advised can hold off on Augmentin  for 2-3 more days to see if sinus symptoms improve on their own.  Can go back to work tomorrow. Avoid touching eyes, continue good handwashing hygiene. Meds as below.  See patient Instructions    Meds & Refills for this Visit:  Requested Prescriptions     Signed Prescriptions Disp Refills    benzonatate 200 MG Oral Cap 21 capsule 0     Sig: Take 1 capsule (200 mg total) by mouth 3 (three) times daily as needed for cough.    ofloxacin 0.3 % Ophthalmic Solution 5 mL 0     Sig: Place 1 drop into the left eye 4 (four) times daily for 7 days.       Risks, benefits, and side effects of medication explained and discussed.    Patient Instructions     Patient Instructions    Conjunctivitis (Pink Eye) is very contagious. This is spread by direct contact after touching your infected eye.   You will be a contagious risk to others until completing at least 24 hours of the antibiotic eye drops   Complete the entire prescription even after the symptoms have gone away.   Launder you pillow case used last night and tomorrow morning.   Apply a new clean warm moist compress to the affected eye as often as possible today. This is comforting and the warm compress increases the blood flow to the area and promotes healing.  Discard any eye makeup worn recently.   If you develop any eye pain or vision changes, go directly to the ER.  Follow up with your PCP if you do not continue to improve with each day.      The patient indicates understanding of these issues and agrees to the plan.  The patient is asked to f/u with PCP or go to IC if sx's persist or worsen.    Kristin Rosario understands video visit evaluation is not a substitute for face-to-face examination or emergency care. Patient advised to go to ER or call 911 for worsening symptoms or acute distress.

## 2024-01-15 NOTE — PATIENT INSTRUCTIONS
Patient Instructions    Conjunctivitis (Pink Eye) is very contagious. This is spread by direct contact after touching your infected eye.   You will be a contagious risk to others until completing at least 24 hours of the antibiotic eye drops   Complete the entire prescription even after the symptoms have gone away.   Launder you pillow case used last night and tomorrow morning.   Apply a new clean warm moist compress to the affected eye as often as possible today. This is comforting and the warm compress increases the blood flow to the area and promotes healing.  Discard any eye makeup worn recently.   If you develop any eye pain or vision changes, go directly to the ER.  Follow up with your PCP if you do not continue to improve with each day.

## 2024-02-02 ENCOUNTER — TELEMEDICINE (OUTPATIENT)
Dept: INTERNAL MEDICINE CLINIC | Facility: CLINIC | Age: 24
End: 2024-02-02
Payer: COMMERCIAL

## 2024-02-02 DIAGNOSIS — Z51.81 THERAPEUTIC DRUG MONITORING: Primary | ICD-10-CM

## 2024-02-02 DIAGNOSIS — E66.3 OVERWEIGHT (BMI 25.0-29.9): ICD-10-CM

## 2024-02-02 DIAGNOSIS — F41.9 ANXIETY: ICD-10-CM

## 2024-02-02 PROCEDURE — 99213 OFFICE O/P EST LOW 20 MIN: CPT | Performed by: NURSE PRACTITIONER

## 2024-02-02 NOTE — PATIENT INSTRUCTIONS
Next steps:  1.  hold on phentermine until in office appt  2.  Schedule a personal nutrition consultation with one of our registered dieticians     Please try to work on the following dietary changes:    1.  Drink water with meals and throughout the day, cut down on soda and/or juice if consumed. Consider flavored water options like Bubbly, Spindrift, Hint and Salome.  2.  Eat breakfast daily and focus on having protein with each meal, examples include: greek yogurt, cottage cheese, hard boiled egg, whole grain toast with peanut butter.   3.  Reduce refined carbohydrates and sugars which includes items such as sweets, as well as rice, pasta, and bread and make sure to choose whole grain options when having them with just 1 serving per meal about the size of your inner palm.  4.  Consume non starchy veggies daily working towards making them a good 50% of your daily food intake. Add them to lunch and dinner consistently.  5.  Start a daily probiotic: VSL#3 is recommended, (order on line at www.vsl3.com). Take 1 capsule daily with water for 30 days, then reduce to 1 every other day (this will reduce the cost). Capsules can be left out for 2 weeks, but then must be refrigerated.      Please download elizabeth My Fitness Pal, LoseIt! Or Net Diary to monitor daily dietary intake and you will be able to see if you are eating the right amount of calories or too much or too little which would hinder weight loss. Additionally this will help to see your daily carbohydrate and protein intake. When you set the elizabeth up choose 1-2 lbs/week as a goal.  Keeping a paper food journal is an option as well to remain accountable for your choices- this is the start to mindful eating! A low calorie diet has been consistently shown to support weight loss.     Continue or start exercising to help establish a routine. If not already exercising begin with 1 day and progress as able with long-term goal of 30 minutes 5 days a week at a minimum.      Meditation daily can help manage and control stress. Chronic stress can make weight loss difficult.  Exercising is one way to help with stress, but meditation using the CALM Felipe or another comparable alternative can be done in your home or place of work with little time commitment. This Felipe can also help work on behavior change and improve sleep. Check out the segment under Calm Masterclass and listen to The 4 Pillars of Health. A great way to begin learning about the foundation of lifestyle with practical tips to use in your every day.     Check out www.yourweightmatters.org blog for continued daily support and education along this weight loss journey!    Patient Resources:     Personal Training/Fitness Classes/Health Coaching     [facility] @ [email or web address] Full fitness center with group fitness and personal training. Discount available as client of [facility].  Health Coaching and Personal Training with Porsha Ken at our [facility]- individual weekly coaching with option to add personal training and small group fitness classes targeted at weight loss- [phone] and/or email @ [email or web address]  360[employer] [place] [email or web address] Group Fitness [phone] and/or email Brittany at [email or web address]  [facility] Fitness Centers with multiple locations: Meizu (www.Maxtena), Eat The Twitpay Fitness (www.KeepIdeas.ViewRay), Fit Body Bootcamp (www.BuildingeyebodyThrillist Media Groupp.ViewRay), [place] (www.web2media.sk), The Exercise  (www.exercisecoach.ViewRay)     Online Fitness  Fitness  on Utube  Fit in 10 DVD series- www.uyrgm29ERF.com  Sit and Be Fit - Chair exercise series Www.sitandbefit.org  Hip Hop Fit with Josias Yang at www.hiphopfit.net     Apps for on the Go Fitness  [place] 7 Minute Workout (orange box with white 7) - free on the go HIIT  training felipe  Peloton Felipe @ www.onepeloton.com     Nutrition Trackers and Tools  LoseIT! And My Fitness Pal apps and on line for tracking nutrition  NOOM - virtual health coaching  FitFoundation (healthy meals on the go) in [place] @ www.dxjumzqvqijbo4c.[employer]  Virginie VASQUEZ @ www.bistromd.com and Vanrxg76 (keto and low carb plans recommended) @ www.cliyut80.com, Metabolic Meals @ www.MyMetabolicMeals.com - individual prepared meals to go  Gobble, Blue Apron, Home , Every Plate, Sunbasket- on line meal delivery programs for preparation at home  Meal [place] in [place] for homemade meals to go @ www.mealMCT [place] (MCTAS: [place]).[employer]  Diet Doctor @ www.dietdoctor.com - low carb swaps  Yummly - meal prep and planning felipe (www.yummly.com)     Stress Management/Behavior/Mindful Eating  CALM meditation felipe (www.calm.com)  Headspace  Am I Hungry? Mindful eating virtual  felipe  Www.yourweightmatters.org - Obesity Action Coalition sponsored Blog posts daily  Motivation felipe (black box with white \")- daily supportive messages sent to your phone     Books/Video Education/Podcasts  Mindless Eating by Agapito Chavez  Why We Get Sick by Malcolm Beckman (a book about insulin resistance)  Atomic Habits by Gerardo Watkins (a book about taking small steps to promote greater behavior change)   Can't Hurt Me by Cliff Chávez (a book exploring the power of discipline in achieving your goals)  The End of Dieting: How to Live for Life by Dr. Troy Oneal M.D. or listen to The [employer] Podcast Episode 63: Understanding \"Nutritarian\" Eating w/Dr. Troy Oneal  Your Body in Balance: The New Science of Food, Hormones, and Health by Dr. Baldemar Jack  The Menopause Diet Plan by Jessi Tarango and Lazara Short  The Complete Guide to fasting by Dr. Haro  Sugar, Salt & Fat by Etta Aden, Ph.D, R.D.  Weight Loss Surgery Will Not Treat Food Addiction by Anila Childs Ph.D  The Game Changers- ClickN KIDS Documentary on plant based nutrition  Fed Up -  documentary about obesity (Free on Utube)  The Truth About Sugar - documentary on sugar (Free on Utube, https://youtu.be/0S3urndQV5s)  The Dr. Patel T5 Wellness Plan by Dr. Vladimir Patel MD  Fitlosophy Fitspiration - journal to better health (found at [employer] in fitness aisle)  What Happened to You?- a look at the impact trauma has on behavior written by Whit Robledo and Dr. Benedict Sosa  Whole Again by [place] - discovering your true self after trauma  Kelsey Valdez talk on fanatix, The Call to [employer]  Podcasts: The Exam Room by the Physician's Committee, Nutrition Facts by Dr. Faust    We are here to support you with weight loss, but please remember that you still need your primary care provider for your routine health maintenance.

## 2024-02-02 NOTE — PROGRESS NOTES
[facility] WEIGHT MANAGEMENT VIRTUAL ENCOUNTER     Kristin Rosario verbally consents to a Virtual/Telephone Check-In service on 02/02/24   Patient understands and accepts financial responsibility for any deductible, co-insurance and/or co-pays associated with this service.    HISTORY OF PRESENT ILLNESS  Chief Complaint   Patient presents with    Other     F/u on weight management      Kristin Rosario is a 23 year old female is being evaluated as a video visit using Telemedicine with live, interactive video and audio    Weight gain/loss since LOV based on home monitoring:   Home scale: # 154 lbs   Has lost  #8 lbs since LOV 5 months ago via telemedicine     Compliance with medication: phentermine 15mg (ran out a couple of months ago)   Tolerating well, helping with decreasing appetite and no side effects     Has only done 1 visit via telemedicine (5 months ago)   When taking the phentermine, left like portions sizes are down  Stressful couple of months- Moved, holidays, etc   Started tracking food, but felt overwhelmed   Finance has been cooking more protein  Exercise/Activity: 2x/ week, via walking up and down stairs   Indoor- bike set-up (hasn't been able to do yet)  Nutrition: eating regular meals, +protein, minimal veggies. +interm. tracking reports  Stress is manageable   Sleep: 8 hours/night, waking up feeling rested    Denies chest pain, shortness of breath, dizziness, blurred vision, headache, paresthesia, nausea/vomiting.     Wt Readings from Last 6 Encounters:   08/18/23 162 lb (73.5 kg)   05/04/23 150 lb 9.6 oz (68.3 kg)   04/24/23 145 lb (65.8 kg)   11/23/22 150 lb (68 kg)   05/09/22 141 lb (64 kg)   01/18/22 151 lb (68.5 kg)          Subjective  REVIEW OF SYSTEMS  GENERAL HEALTH: feels well otherwise, denied any fevers chills or night sweats   RESPIRATORY: denies shortness of breath   CARDIOVASCULAR: denies chest pain  GI: denies abdominal pain  PSYCH: denies any mood  changes    Objective  EXAM  Reviewed most recent set of vitals   Physical Exam:  GENERAL: well developed, well nourished, in no apparent distress, speaking in full sentences comfortably   SKIN: warm, pink, dry without rashes to exposed area   EYES: conjunctiva pink  HEENT: atraumatic, normocephalic  LUNGS: normal work of breathing, non labored  CARDIO: normal work, no exertion  EXTREMITIES: no cyanosis, no clubbing, no edema  NEURO: Oriented times three  PSYCH: pleasant, cooperative, normal mood and affect    Lab Results   Component Value Date    WBC 7.6 11/16/2022    RBC 4.67 11/16/2022    HGB 14.2 11/16/2022    HCT 43.1 11/16/2022    MCV 92.3 11/16/2022    MCH 30.4 11/16/2022    MCHC 32.9 11/16/2022    RDW 11.9 11/16/2022    .0 11/16/2022     Lab Results   Component Value Date    GLU 89 11/16/2022    BUN 15 11/16/2022    BUNCREA 27.0 (H) 04/28/2021    CREATSERUM 0.80 11/16/2022    ANIONGAP 3 11/16/2022    CA 9.2 11/16/2022    OSMOCALC 286 11/16/2022    ALKPHO 55 11/16/2022    AST 12 (L) 11/16/2022    ALT 17 11/16/2022    BILT 0.4 11/16/2022    TP 7.5 11/16/2022    ALB 4.1 11/16/2022    GLOBULIN 3.4 11/16/2022    AGRATIO 2.0 07/01/2014     11/16/2022    K 3.9 11/16/2022     11/16/2022    CO2 25.0 11/16/2022     No results found for: \"EAG\", \"A1C\"  Lab Results   Component Value Date    CHOLEST 151 11/16/2022    TRIG 99 11/16/2022    HDL 55 11/16/2022    LDL 78 11/16/2022    VLDL 15 11/16/2022    NONHDLC 96 11/16/2022     Lab Results   Component Value Date    TSH 3.710 11/16/2022     No results found for: \"B12\", \"VITB12\"  No results found for: \"VITD\", \"QVITD\", \"AGGN32UF\"    Current Outpatient Medications on File Prior to Visit   Medication Sig Dispense Refill    benzonatate 200 MG Oral Cap Take 1 capsule (200 mg total) by mouth 3 (three) times daily as needed for cough. 21 capsule 0    FLUoxetine 10 MG Oral Tab Take 1 tablet (10 mg total) by mouth daily. 90 tablet 0    Phentermine HCl 15 MG Oral Cap  Take 1 capsule (15 mg total) by mouth every morning. 30 capsule 0    Levonorgestrel (KYLEENA IU) by Intrauterine route.       No current facility-administered medications on file prior to visit.       ASSESSMENT  Analyzed weight data:       Diagnoses and all orders for this visit:    Therapeutic drug monitoring    Overweight (BMI 25.0-29.9)    Anxiety    HAVE NEVER SEEN PATIENT IN OFFICE- NEED IN OFFICE VITALS, ASSESSMENT, BEFORE GETTING REFILL ON PHENTERMINE     PLAN  Will hold on medications: phentermine (see above)   --advised of side effects and adverse effects of this medication  Contradictions: none  Reviewed labs   Anxiety, stable- currently seeing therapist   Inter. Hip pain, can think about trying out aqua therapy   Advised to monitor blood pressure and pulse at home/ given parameters to review and contact provider.  Nutrition: low carb diet/ recommended to eat breakfast daily/ regular protein intake  Follow up with dietitian and psychologist as recommended.  Discussed the role of sleep and stress in weight management.  Counseled on comprehensive weight loss plan including attention to nutrition, exercise and behavior/stress management for success. See patient instruction below for more details.  Discussed strategies to overcome barriers to successful weight loss and weight maintenance  FITTE: ACSM recommendations (150-300 minutes/ week in active weight loss)       There are no Patient Instructions on file for this visit.    No follow-ups on file.    Patient verbalizes understanding.    Total time spent on chart review, pre-charting, obtaining history, counseling, and educating, reviewing labs was 23 minutes.       Pt understands phone/video evaluation is not a substitute for face to face examination or emergency care. Pt advised to go to the ER or call 911 for worsening symptoms or acute distress.       Please note that the following visit was completed using two-way, real-time interactive audio and/or  video communication.  This has been done in good jakob to provide continuity of care in the best interest of the provider-patient relationship, due to the ongoing public health crisis/national emergency and because of restrictions of visitation.  There are limitations of this visit as no physical exam could be performed.  Every conscious effort was taken to allow for sufficient and adequate time.  This billing was spent on reviewing labs, medications, radiology tests and decision making.  Appropriate medical decision-making and tests are ordered as detailed in the plan of care above.     NOTE TO PATIENT: The 21st Century Cures Act makes clinical notes like these available to patients in the interest of transparency. Clinical notes are medical documents used by physicians and care providers to communicate with each other. These documents include medical language and terminology, abbreviations, and treatment information that may sound technical and at times possibly unfamiliar. In addition, at times, the verbiage may appear blunt or direct. These documents are one tool providers use to communicate relevant information and clinical opinions of the care providers in a way that allows common understanding of the clinical context.     Tiana Garcia, APRN  2/2/2024

## 2024-02-03 ENCOUNTER — LAB ENCOUNTER (OUTPATIENT)
Dept: LAB | Age: 24
End: 2024-02-03
Attending: INTERNAL MEDICINE
Payer: COMMERCIAL

## 2024-02-03 DIAGNOSIS — Z13.0 SCREENING FOR BLOOD DISEASE: ICD-10-CM

## 2024-02-03 DIAGNOSIS — Z13.29 SCREENING FOR THYROID DISORDER: ICD-10-CM

## 2024-02-03 DIAGNOSIS — Z00.00 ROUTINE GENERAL MEDICAL EXAMINATION AT A HEALTH CARE FACILITY: ICD-10-CM

## 2024-02-03 DIAGNOSIS — Z13.228 SCREENING FOR METABOLIC DISORDER: ICD-10-CM

## 2024-02-03 DIAGNOSIS — Z13.220 SCREENING, LIPID: ICD-10-CM

## 2024-02-03 LAB
ALBUMIN SERPL-MCNC: 4.1 G/DL (ref 3.4–5)
ALBUMIN/GLOB SERPL: 1.2 {RATIO} (ref 1–2)
ALP LIVER SERPL-CCNC: 54 U/L
ALT SERPL-CCNC: 15 U/L
ANION GAP SERPL CALC-SCNC: 1 MMOL/L (ref 0–18)
AST SERPL-CCNC: 13 U/L (ref 15–37)
BASOPHILS # BLD AUTO: 0.04 X10(3) UL (ref 0–0.2)
BASOPHILS NFR BLD AUTO: 0.6 %
BILIRUB SERPL-MCNC: 0.5 MG/DL (ref 0.1–2)
BUN BLD-MCNC: 17 MG/DL (ref 9–23)
CALCIUM BLD-MCNC: 8.9 MG/DL (ref 8.5–10.1)
CHLORIDE SERPL-SCNC: 111 MMOL/L (ref 98–112)
CHOLEST SERPL-MCNC: 178 MG/DL (ref ?–200)
CO2 SERPL-SCNC: 27 MMOL/L (ref 21–32)
CREAT BLD-MCNC: 0.86 MG/DL
EGFRCR SERPLBLD CKD-EPI 2021: 96 ML/MIN/1.73M2 (ref 60–?)
EOSINOPHIL # BLD AUTO: 0.15 X10(3) UL (ref 0–0.7)
EOSINOPHIL NFR BLD AUTO: 2.3 %
ERYTHROCYTE [DISTWIDTH] IN BLOOD BY AUTOMATED COUNT: 11.9 %
FASTING PATIENT LIPID ANSWER: YES
FASTING STATUS PATIENT QL REPORTED: YES
GLOBULIN PLAS-MCNC: 3.4 G/DL (ref 2.8–4.4)
GLUCOSE BLD-MCNC: 88 MG/DL (ref 70–99)
HCT VFR BLD AUTO: 40.2 %
HDLC SERPL-MCNC: 50 MG/DL (ref 40–59)
HGB BLD-MCNC: 13.3 G/DL
IMM GRANULOCYTES # BLD AUTO: 0.01 X10(3) UL (ref 0–1)
IMM GRANULOCYTES NFR BLD: 0.2 %
LDLC SERPL CALC-MCNC: 115 MG/DL (ref ?–100)
LYMPHOCYTES # BLD AUTO: 2.13 X10(3) UL (ref 1–4)
LYMPHOCYTES NFR BLD AUTO: 32.5 %
MCH RBC QN AUTO: 29.8 PG (ref 26–34)
MCHC RBC AUTO-ENTMCNC: 33.1 G/DL (ref 31–37)
MCV RBC AUTO: 89.9 FL
MONOCYTES # BLD AUTO: 0.57 X10(3) UL (ref 0.1–1)
MONOCYTES NFR BLD AUTO: 8.7 %
NEUTROPHILS # BLD AUTO: 3.65 X10 (3) UL (ref 1.5–7.7)
NEUTROPHILS # BLD AUTO: 3.65 X10(3) UL (ref 1.5–7.7)
NEUTROPHILS NFR BLD AUTO: 55.7 %
NONHDLC SERPL-MCNC: 128 MG/DL (ref ?–130)
OSMOLALITY SERPL CALC.SUM OF ELEC: 289 MOSM/KG (ref 275–295)
PLATELET # BLD AUTO: 323 10(3)UL (ref 150–450)
POTASSIUM SERPL-SCNC: 4.1 MMOL/L (ref 3.5–5.1)
PROT SERPL-MCNC: 7.5 G/DL (ref 6.4–8.2)
RBC # BLD AUTO: 4.47 X10(6)UL
SODIUM SERPL-SCNC: 139 MMOL/L (ref 136–145)
TRIGL SERPL-MCNC: 68 MG/DL (ref 30–149)
TSI SER-ACNC: 2.13 MIU/ML (ref 0.36–3.74)
VLDLC SERPL CALC-MCNC: 12 MG/DL (ref 0–30)
WBC # BLD AUTO: 6.6 X10(3) UL (ref 4–11)

## 2024-02-03 PROCEDURE — 85025 COMPLETE CBC W/AUTO DIFF WBC: CPT

## 2024-02-03 PROCEDURE — 80053 COMPREHEN METABOLIC PANEL: CPT

## 2024-02-03 PROCEDURE — 80061 LIPID PANEL: CPT

## 2024-02-03 PROCEDURE — 84443 ASSAY THYROID STIM HORMONE: CPT

## 2024-02-03 PROCEDURE — 36415 COLL VENOUS BLD VENIPUNCTURE: CPT

## 2024-02-09 ENCOUNTER — OFFICE VISIT (OUTPATIENT)
Dept: INTERNAL MEDICINE CLINIC | Facility: CLINIC | Age: 24
End: 2024-02-09
Payer: COMMERCIAL

## 2024-02-09 VITALS
BODY MASS INDEX: 26.55 KG/M2 | WEIGHT: 159.38 LBS | RESPIRATION RATE: 18 BRPM | OXYGEN SATURATION: 100 % | SYSTOLIC BLOOD PRESSURE: 108 MMHG | HEIGHT: 65 IN | HEART RATE: 75 BPM | DIASTOLIC BLOOD PRESSURE: 76 MMHG | TEMPERATURE: 98 F

## 2024-02-09 DIAGNOSIS — F41.9 ANXIETY: ICD-10-CM

## 2024-02-09 DIAGNOSIS — M25.551 CHRONIC HIP PAIN, BILATERAL: ICD-10-CM

## 2024-02-09 DIAGNOSIS — S73.192S TEAR OF LEFT ACETABULAR LABRUM, SEQUELA: ICD-10-CM

## 2024-02-09 DIAGNOSIS — Z00.00 ROUTINE GENERAL MEDICAL EXAMINATION AT A HEALTH CARE FACILITY: Primary | ICD-10-CM

## 2024-02-09 DIAGNOSIS — M25.552 CHRONIC HIP PAIN, BILATERAL: ICD-10-CM

## 2024-02-09 DIAGNOSIS — G89.29 CHRONIC HIP PAIN, BILATERAL: ICD-10-CM

## 2024-02-09 DIAGNOSIS — R41.840 DIFFICULTY CONCENTRATING: ICD-10-CM

## 2024-02-09 PROCEDURE — 3078F DIAST BP <80 MM HG: CPT | Performed by: INTERNAL MEDICINE

## 2024-02-09 PROCEDURE — 3074F SYST BP LT 130 MM HG: CPT | Performed by: INTERNAL MEDICINE

## 2024-02-09 PROCEDURE — 99395 PREV VISIT EST AGE 18-39: CPT | Performed by: INTERNAL MEDICINE

## 2024-02-09 PROCEDURE — 3008F BODY MASS INDEX DOCD: CPT | Performed by: INTERNAL MEDICINE

## 2024-02-09 NOTE — PROGRESS NOTES
Kristin Rosario  7/14/2000    Chief Complaint   Patient presents with    Physical     Rm 6        HPI:   Kristin Rosario is a 23 year old female who presents for an annual physical examination.    Since last evaluation the patient has overall maintained her usual state of health.  She has maintained compliance with fluoxetine 10 mg daily.  However, she reports a persistent difficulty with focus and concentration in several different environments on a day-to-day basis.  She also reports recurrent epistaxis without identified trigger.  No further acute concerns at this time.    Current Outpatient Medications   Medication Sig Dispense Refill    FLUoxetine 10 MG Oral Tab Take 1 tablet (10 mg total) by mouth daily. 90 tablet 0    Levonorgestrel (KYLEENA IU) by Intrauterine route.      benzonatate 200 MG Oral Cap Take 1 capsule (200 mg total) by mouth 3 (three) times daily as needed for cough. (Patient not taking: Reported on 2/9/2024) 21 capsule 0    Phentermine HCl 15 MG Oral Cap Take 1 capsule (15 mg total) by mouth every morning. (Patient not taking: Reported on 2/9/2024) 30 capsule 0      Allergies   Allergen Reactions    Codeine      Migraine    Seasonal Runny nose      Past Medical History:   Diagnosis Date    Atypical squamous cells of undetermined significance (ASCUS) on Papanicolaou smear of cervix 12/2019    Bursitis     COVID-19 12/18/2021    SEASONAL ALLERGIES       Patient Active Problem List   Diagnosis    Sx.12/20/16, CPT.31782, R Exc Hag[place] Deformity, w/, Global Exp.3/20/17    Tear of left acetabular labrum    Atypical squamous cell changes of undetermined significance (ASCUS) on cervical cytology     Anxiety      Past Surgical History:   Procedure Laterality Date    ANKLE FRACTURE SURGERY  2016    Retrocalcanila bursitis surgery     IUD KYLEENA  01/14/2020    OTHER  12/2016    retrocalcanial bursitis removal       Family History   Problem Relation Age of Onset    Obesity Mother      Genito-Urinary Disorder Mother         PCOS    High Cholesterol Mother     Other (Other) Mother         psoriasis    Clotting Disorder Maternal Grandmother         PE    Neurological Disorder Maternal Grandmother         MS    Obesity Maternal Grandmother     High Cholesterol Maternal Grandfather     Hypertension Maternal Grandfather     Diabetes Maternal Grandfather     Heart Disease Maternal Grandfather     Heart Attack Maternal Grandfather     Heart Surgery Maternal Grandfather         CABGx4    Heart Disorder Maternal Grandfather         AAA    Thyroid Disorder Paternal Grandmother     Other (parkinsons) Paternal Grandmother     Hypertension Paternal Grandfather     High Cholesterol Paternal Grandfather     Diabetes Paternal Grandfather     Diabetes Paternal Great-Grandfather       Social History     Socioeconomic History    Marital status: Single    Number of children: 0   Occupational History    Occupation: student   Tobacco Use    Smoking status: Never    Smokeless tobacco: Never   Vaping Use    Vaping Use: Never used   Substance and Sexual Activity    Alcohol use: Yes     Comment: social    Drug use: No    Sexual activity: Yes     Partners: Male     Birth control/protection: I.U.D.   Other Topics Concern     Service No    Blood Transfusions No    Caffeine Concern No    Occupational Exposure No    Hobby Hazards No    Sleep Concern No    Stress Concern No    Weight Concern No    Special Diet No    Back Care No    Exercise Yes    Bike Helmet No    Seat Belt Yes    Self-Exams No   Social History Narrative    Lives with family         REVIEW OF SYSTEMS:   GENERAL: feels well otherwise  SKIN: no rashes  EYES:denies blurred vision or double vision  HEENT: Not congested  LUNGS: denies shortness of breath with exertion  CARDIOVASCULAR: denies chest pain on exertion  GI: no nausea or abdominal pain  NEURO: denies headaches    EXAM:   /76   Pulse 75   Temp 98.2 °F (36.8 °C) (Skin)   Resp 18   Ht 5' 5\"  (1.651 m)   Wt 159 lb 6.4 oz (72.3 kg)   LMP 04/21/2023 (Exact Date)   SpO2 100%   BMI 26.53 kg/m²   GENERAL: Well developed, well nourished,in no apparent distress  SKIN: No rashes,no suspicious lesions  EYES: Bilateral conjunctiva are clear  HEENT: atraumatic, normocephalic.  Tympanic membrane within normal limits bilaterally.  NECK: supple,no adenopathy,no bruits  LUNGS: clear to auscultation  CARDIO: RRR without murmur  GI: good BS's,no masses, HSM or tenderness    ASSESSMENT AND PLAN:   Kristin Rosario is a 23 year old female who presents for an annual physical examination.    Outstanding screening and preventive measures:  None     Recurrent epistaxis:  Referred to ENT service    Difficulty concentrating:  Referred for neuropsych testing; recommendations to follow    Anxiety:  Stable with use of fluoxetine 10 mg daily    Chronic bilateral hip pain:  Secondary to labral tear  Improving with self-directed stretching and exercises, and trigger avoidance   Following as needed with the orthopedic surgery service    The patient indicates understanding of these issues and agrees to the plan.  TODAY'S ORDERS     No orders of the defined types were placed in this encounter.      Meds & Refills:  Requested Prescriptions      No prescriptions requested or ordered in this encounter       Imaging & Consults:  [facility] REFERRAL TO [facility]    No follow-ups on file.  There are no Patient Instructions on file for this visit.    All questions were answered and the patient agrees with the plan.     Thank you,  Ricky Greenfield MD   Attending Only

## 2024-02-15 ENCOUNTER — TELEPHONE (OUTPATIENT)
Age: 24
End: 2024-02-15

## 2024-02-16 ENCOUNTER — HOSPITAL ENCOUNTER (OUTPATIENT)
Age: 24
Discharge: HOME | End: 2024-02-16
Payer: COMMERCIAL

## 2024-02-16 VITALS
HEART RATE: 111 BPM | HEIGHT: 62 IN | OXYGEN SATURATION: 95 % | TEMPERATURE: 99 F | WEIGHT: 155 LBS | SYSTOLIC BLOOD PRESSURE: 105 MMHG | RESPIRATION RATE: 16 BRPM | DIASTOLIC BLOOD PRESSURE: 62 MMHG | BODY MASS INDEX: 28.52 KG/M2

## 2024-02-16 DIAGNOSIS — J11.1 INFLUENZA: Primary | ICD-10-CM

## 2024-02-16 DIAGNOSIS — R68.89 FLU-LIKE SYMPTOMS: ICD-10-CM

## 2024-02-16 DIAGNOSIS — J02.9 SORE THROAT: ICD-10-CM

## 2024-02-16 LAB
POCT INFLUENZA A: POSITIVE
POCT INFLUENZA B: NEGATIVE
S PYO AG THROAT QL: NEGATIVE

## 2024-02-16 PROCEDURE — 87502 INFLUENZA DNA AMP PROBE: CPT | Performed by: NURSE PRACTITIONER

## 2024-02-16 PROCEDURE — 99213 OFFICE O/P EST LOW 20 MIN: CPT | Performed by: NURSE PRACTITIONER

## 2024-02-16 PROCEDURE — 87880 STREP A ASSAY W/OPTIC: CPT | Performed by: NURSE PRACTITIONER

## 2024-02-16 RX ORDER — OSELTAMIVIR PHOSPHATE 75 MG/1
75 CAPSULE ORAL 2 TIMES DAILY
Qty: 10 CAPSULE | Refills: 0 | Status: SHIPPED | OUTPATIENT
Start: 2024-02-16 | End: 2024-02-21

## 2024-02-16 NOTE — ED INITIAL ASSESSMENT (HPI)
Pt with c/o body aches, chills, cough and fever.  Pt c/o sweating and fatigue.  Symptoms started on Wednesday.  Exposed to flu

## 2024-02-16 NOTE — ED PROVIDER NOTES
Patient Seen in: [facility]      History     Chief Complaint   Patient presents with    Cough/URI     Stated Complaint: Flu symptoms    Subjective:   23-year-old female, with 2 days of bodyaches, chills, sore throat, cough.  States that she was exposed to her fiancé who did have the flu.  No vomiting.  No trouble breathing.  States both of them did COVID tests at home and were negative.            Objective:   Past Medical History:   Diagnosis Date    Atypical squamous cells of undetermined significance (ASCUS) on Papanicolaou smear of cervix 12/2019    Bursitis     COVID-19 12/18/2021    SEASONAL ALLERGIES               Past Surgical History:   Procedure Laterality Date    ANKLE FRACTURE SURGERY  2016    Retrocalcanila bursitis surgery     IUD KYLEENA  01/14/2020    OTHER  12/2016    retrocalcanial bursitis removal                 Social History     Socioeconomic History    Marital status: Single    Number of children: 0   Occupational History    Occupation: student   Tobacco Use    Smoking status: Never    Smokeless tobacco: Never   Vaping Use    Vaping Use: Never used   Substance and Sexual Activity    Alcohol use: Yes     Comment: social    Drug use: No    Sexual activity: Yes     Partners: Male     Birth control/protection: I.U.D.   Other Topics Concern     Service No    Blood Transfusions No    Caffeine Concern No    Occupational Exposure No    Hobby Hazards No    Sleep Concern No    Stress Concern No    Weight Concern No    Special Diet No    Back Care No    Exercise Yes    Bike Helmet No    Seat Belt Yes    Self-Exams No   Social History Narrative    Lives with family              Review of Systems   Constitutional:  Positive for chills.   HENT:  Positive for sore throat. Negative for congestion.    Respiratory:  Positive for cough.    Gastrointestinal:  Negative for vomiting.   Musculoskeletal:  Positive for myalgias.   All other systems reviewed and are negative.      Positive for  stated complaint: Flu symptoms  Other systems are as noted in HPI.  Constitutional and vital signs reviewed.      All other systems reviewed and negative except as noted above.    Physical Exam     ED Triage Vitals [02/16/24 0958]   /62   Pulse 111   Resp 16   Temp 98.6 °F (37 °C)   Temp src Temporal   SpO2 95 %   O2 Device None (Room air)       Current:/62   Pulse 111   Temp 98.6 °F (37 °C) (Temporal)   Resp 16   Ht 157.5 cm (5' 2\")   Wt 70.3 kg   LMP 04/21/2023 (Exact Date)   SpO2 95%   BMI 28.35 kg/m²         Physical Exam  Vitals and nursing note reviewed.   Constitutional:       General: She is not in acute distress.     Appearance: Normal appearance. She is not ill-appearing, toxic-appearing or diaphoretic.   HENT:      Head:      Jaw: No trismus.      Right Ear: Tympanic membrane, ear canal and external ear normal.      Left Ear: Tympanic membrane, ear canal and external ear normal.      Nose: No congestion or rhinorrhea.      Mouth/Throat:      Lips: Pink. No lesions.      Mouth: Mucous membranes are moist. No oral lesions.      Tongue: No lesions.      Palate: No lesions.      Pharynx: Oropharynx is clear. Uvula midline. No pharyngeal swelling, oropharyngeal exudate, posterior oropharyngeal erythema or uvula swelling.   Cardiovascular:      Rate and Rhythm: Normal rate and regular rhythm.      Pulses: Normal pulses.      Heart sounds: Normal heart sounds.   Pulmonary:      Effort: Pulmonary effort is normal. No respiratory distress.      Breath sounds: Normal breath sounds.   Musculoskeletal:      Cervical back: Normal range of motion and neck supple.   Skin:     General: Skin is warm and dry.      Coloration: Skin is not pale.      Findings: No rash.   Neurological:      General: No focal deficit present.      Mental Status: She is alert.               ED Course     Labs Reviewed   POCT FLU TEST - Abnormal; Notable for the following components:       Result Value    POCT INFLUENZA A  Positive (*)     All other components within normal limits    Narrative:     This assay is a rapid molecular in vitro test utilizing nucleic acid amplification of influenza A and B viral RNA.   POCT RAPID STREP - Normal                      MDM                                         Medical Decision Making  Differential diagnosis initially included but was not limited to: Strep, flu, other viral illness    Nontoxic-appearing 23-year-old female with bodyaches, chills, sore throat, cough.  Not tachypneic or hypoxic.  No signs of increased work of breathing or respiratory distress.  No adventitious breath sounds.There is no trismus, drooling, unilateral tonsillar tonsillar swelling, voice change/muffled voice, so I do not think this is epiglottitis/peritonsillar abscess.  Positive for influenza.  Likely viral etiology.  She did want Tamiflu which I prescribed.  Negative strep.    Supportive/home management of diagnosis/illness/injury discussed. Red flag symptoms discussed.  Signs and symptoms/criteria that would necessitate reevaluation, including ER evaluation discussed.  Patient and/or responsible adult verbalize and agree with management and plan of care.    Speech recognition software was used during this dictation.  There may be minor errors in transcription.      Amount and/or Complexity of Data Reviewed  Labs: ordered. Decision-making details documented in ED Course.    Risk  OTC drugs.  Prescription drug management.        Disposition and Plan     Clinical Impression:  1. Influenza    2. Flu-like symptoms    3. Sore throat         Disposition:  Discharge  2/16/2024 10:31 am    Follow-up:  Ricky Greenfield MD  [address] [place] [id number]  [phone]    Schedule an appointment as soon as possible for a visit in 5 days            Medications Prescribed:  Current Discharge Medication List        START taking these medications    Details   oseltamivir (TAMIFLU) 75 MG Oral Cap Take 1 capsule (75 mg  total) by mouth 2 (two) times daily for 5 days.  Qty: 10 capsule, Refills: 0

## 2024-02-16 NOTE — DISCHARGE INSTRUCTIONS
Take the Tamiflu as prescribed.  Stay well-hydrated and well-nourished.  Humidifier in the same room.  Hot steam showers/steam inhalations.  Over-the-counter Motrin and/or Tylenol as needed.  Interventions for sore throat: Warm salt water gargles 3 times daily.  Take a teaspoon of honey on its own or  in another liquid like juice or tea. Cough or throat drops.Over-the-counter Robitussin or other over-the-counter cough preparations as needed for the cough.  Follow-up with your doctor.  Return or go to the ER for new or worsening symptoms.

## 2024-02-22 ENCOUNTER — TELEPHONE (OUTPATIENT)
Age: 24
End: 2024-02-22

## 2024-02-26 ENCOUNTER — OFFICE VISIT (OUTPATIENT)
Dept: INTERNAL MEDICINE CLINIC | Facility: CLINIC | Age: 24
End: 2024-02-26
Payer: COMMERCIAL

## 2024-02-26 VITALS
HEART RATE: 100 BPM | BODY MASS INDEX: 27.82 KG/M2 | DIASTOLIC BLOOD PRESSURE: 78 MMHG | SYSTOLIC BLOOD PRESSURE: 108 MMHG | RESPIRATION RATE: 16 BRPM | HEIGHT: 63 IN | WEIGHT: 157 LBS

## 2024-02-26 DIAGNOSIS — F41.9 ANXIETY: ICD-10-CM

## 2024-02-26 DIAGNOSIS — E66.3 OVERWEIGHT (BMI 25.0-29.9): ICD-10-CM

## 2024-02-26 DIAGNOSIS — Z51.81 THERAPEUTIC DRUG MONITORING: Primary | ICD-10-CM

## 2024-02-26 PROCEDURE — 3008F BODY MASS INDEX DOCD: CPT | Performed by: NURSE PRACTITIONER

## 2024-02-26 PROCEDURE — 99214 OFFICE O/P EST MOD 30 MIN: CPT | Performed by: NURSE PRACTITIONER

## 2024-02-26 PROCEDURE — 3074F SYST BP LT 130 MM HG: CPT | Performed by: NURSE PRACTITIONER

## 2024-02-26 PROCEDURE — 3078F DIAST BP <80 MM HG: CPT | Performed by: NURSE PRACTITIONER

## 2024-02-26 RX ORDER — IBUPROFEN 600 MG/1
600 TABLET ORAL 2 TIMES DAILY
COMMUNITY
Start: 2024-02-23

## 2024-02-26 RX ORDER — AMOXICILLIN 500 MG/1
500 CAPSULE ORAL 3 TIMES DAILY
COMMUNITY
Start: 2024-02-23

## 2024-02-26 RX ORDER — PHENTERMINE HYDROCHLORIDE 15 MG/1
15 CAPSULE ORAL EVERY MORNING
Qty: 30 CAPSULE | Refills: 2 | Status: SHIPPED | OUTPATIENT
Start: 2024-02-26

## 2024-02-26 NOTE — PROGRESS NOTES
HISTORY OF PRESENT ILLNESS  Chief Complaint   Patient presents with    Weight Check     3 pound weight gain     Kristin Rosario is a 23 year old female here for follow up with medical weight loss program for the treatment of overweight, obesity, or morbid obesity.     Up #3 lbs  (f/u from 2/2023 via telemedicine)  Non-compliant on phentermine (ran out a couple of months ago, but was on hold- since was never seen in office didn't get refill)  Tolerating well, helping with decreasing appetite and no side effects     Got sick with flu for a couple of weeks (admits that food choices weren't the best)  Hilo teeth out last week   Started walking at lunch  Is going to start doing girls    Exercise/Activity: 2x/ week, via walking  Indoor- bike set-up (hasn't been able to do yet)  Nutrition: eating regular meals, +protein, minimal veggies. +interm. tracking reports  Meals out per week on average: 1-2  Stress is manageable   Sleep: 8 hours/night, waking up feeling rested    Denies chest pain, shortness of breath, dizziness, blurred vision, headache, paresthesia, nausea/vomiting.     Breakfast Lunch Dinner Snacks Fluids   Reviewed              Wt Readings from Last 6 Encounters:   02/26/24 157 lb (71.2 kg)   02/16/24 155 lb (70.3 kg)   02/09/24 159 lb 6.4 oz (72.3 kg)   08/18/23 162 lb (73.5 kg)   05/04/23 150 lb 9.6 oz (68.3 kg)   04/24/23 145 lb (65.8 kg)          REVIEW OF SYSTEMS  GENERAL: feels well otherwise, denied any fevers chills or night sweats   LUNGS: denies shortness of breath  CARDIOVASCULAR: denies chest pain  GI: denies abdominal pain  MUSCULOSKELETAL: denies back pain, joint pains   PSYCH: denies change in behavior or mood, denies feeling sad or depressed    EXAM  /78   Pulse 100   Resp 16   Ht 5' 3\" (1.6 m)   Wt 157 lb (71.2 kg)   LMP 02/12/2024 (Exact Date)   BMI 27.81 kg/m²       GENERAL: well developed, well nourished, in no apparent distress, A/O x3  SKIN: no rashes, no  suspicious lesions  HEENT: atraumatic, normocephalic, OP-clear, PERRLA  NECK: supple, no adenopathy  LUNGS: CTA in all fields, breathing non labored  CARDIO: RRR without murmur  GI: +BS, NT/ND, no masses or HSM  EXTREMITIES: no cyanosis, no clubbing, no edema    Lab Results   Component Value Date    GLU 88 02/03/2024    BUN 17 02/03/2024    BUNCREA 27.0 (H) 04/28/2021    CREATSERUM 0.86 02/03/2024    ANIONGAP 1 02/03/2024    CA 8.9 02/03/2024    OSMOCALC 289 02/03/2024    ALKPHO 54 02/03/2024    AST 13 (L) 02/03/2024    ALT 15 02/03/2024    BILT 0.5 02/03/2024    TP 7.5 02/03/2024    ALB 4.1 02/03/2024    GLOBULIN 3.4 02/03/2024    AGRATIO 2.0 07/01/2014     02/03/2024    K 4.1 02/03/2024     02/03/2024    CO2 27.0 02/03/2024     No results found for: \"EAG\", \"A1C\"  Lab Results   Component Value Date    CHOLEST 178 02/03/2024    TRIG 68 02/03/2024    HDL 50 02/03/2024     (H) 02/03/2024    VLDL 12 02/03/2024    NONHDLC 128 02/03/2024     No results found for: \"B12\", \"VITB12\"  No results found for: \"VITD\", \"QVITD\", \"VPVC54FS\"    Current Outpatient Medications on File Prior to Visit   Medication Sig Dispense Refill    amoxicillin 500 MG Oral Cap Take 1 capsule (500 mg total) by mouth 3 (three) times daily.      ibuprofen 600 MG Oral Tab Take 1 tablet (600 mg total) by mouth 2 (two) times daily.      FLUoxetine 10 MG Oral Tab Take 1 tablet (10 mg total) by mouth daily. 90 tablet 0    Levonorgestrel (KYLEENA IU) by Intrauterine route.      benzonatate 200 MG Oral Cap Take 1 capsule (200 mg total) by mouth 3 (three) times daily as needed for cough. (Patient not taking: Reported on 2/9/2024) 21 capsule 0     No current facility-administered medications on file prior to visit.       ASSESSMENT/PLAN    ICD-10-CM    1. Therapeutic drug monitoring  Z51.81 Phentermine HCl 15 MG Oral Cap      2. Overweight (BMI 25.0-29.9)  E66.3 Phentermine HCl 15 MG Oral Cap      3. Anxiety  F41.9           PLAN   Initial  Weight Data and Goal Weight Loss:  Initial consult: #162 lbs on 8/2023  Weight Calculations  Initial Weight: 162 lbs  Initial Weight Date: 08/01/23  Today's Weight: 157 lbs  5% Goal: 8.1  10% Goal: 16.2  Total Weight Loss: 5 lbs  Total weight loss: up #3 lbs total, Net loss 5 lbs  Will restart medications: phentermine 15mg   --advised of side effects and adverse effects of this medication  Contradictions: none  Reviewed labs   Body composition: body fat 32.4%, visceral fat 6, muscle mass 29.3 lbs  Anxiety, stable- currently seeing therapist   Inter. Hip pain, can think about trying out aqua therapy (mentioned with last visit)  Encouraged to track macros  Nutrition: Low carb diet, recommended to eat breakfast daily/ regular protein intake  Follow up with dietitian and psychologist as recommended.  Discussed the role of sleep and stress in weight management.  Counseled on comprehensive weight loss plan including attention to nutrition, exercise and behavior/stress management for success. See patient instruction below for more details.  Discussed strategies to overcome barriers to successful weight loss and weight maintenance  FITTE: ACSM recommendations (150-300 minutes/ week in active weight loss)   Weight Loss Consent to treat reviewed and signed.    Total time spent on chart review, pre-charting, obtaining history, counseling, and educating, reviewing labs was 30 minutes.       NOTE TO PATIENT: The 21st Century Cures Act makes clinical notes like these available to patients in the interest of transparency. Clinical notes are medical documents used by physicians and care providers to communicate with each other. These documents include medical language and terminology, abbreviations, and treatment information that may sound technical and at times possibly unfamiliar. In addition, at times, the verbiage may appear blunt or direct. These documents are one tool providers use to communicate relevant information and  clinical opinions of the care providers in a way that allows common understanding of the clinical context.     There are no Patient Instructions on file for this visit.    No follow-ups on file.    Patient verbalizes understanding.    Tiana Garcia APRN         [Follow-Up: _____] : a [unfilled] follow-up visit [Spouse] : spouse

## 2024-02-26 NOTE — PATIENT INSTRUCTIONS
Next steps:  1.  Fill your prescribed medication and take as discussed and prescribed: phentermine  2.  Schedule a personal nutrition consultation with one of our registered dieticians     Please try to work on the following dietary changes:  Daily protein recommendation to start:  grams  Daily carbohydrate: <105g   Daily calories: 1,200-1,300  1.  Drink water with meals and throughout the day, cut down on soda and/or juice if consumed. Consider flavored water options like Bubbly, Spindrift, Hint and Salome.  2.  Eat breakfast daily and focus on having protein with each meal, examples include: greek yogurt, cottage cheese, hard boiled egg, whole grain toast with peanut butter.   3.  Reduce refined carbohydrates and sugars which includes items such as sweets, as well as rice, pasta, and bread and make sure to choose whole grain options when having them with just 1 serving per meal about the size of your inner palm.  4.  Consume non starchy veggies daily working towards making them a good 50% of your daily food intake. Add them to lunch and dinner consistently.  5.  Start a daily probiotic: VSL#3 is recommended, (order on line at www.vsl3.com). Take 1 capsule daily with water for 30 days, then reduce to 1 every other day (this will reduce the cost). Capsules can be left out for 2 weeks, but then must be refrigerated.      Please download elizabeth My Fitness Pal, LoseIt! Or Net Diary to monitor daily dietary intake and you will be able to see if you are eating the right amount of calories or too much or too little which would hinder weight loss. Additionally this will help to see your daily carbohydrate and protein intake. When you set the elizabeth up choose 1-2 lbs/week as a goal.  Keeping a paper food journal is an option as well to remain accountable for your choices- this is the start to mindful eating! A low calorie diet has been consistently shown to support weight loss.     Continue or start exercising to help  establish a routine. If not already exercising begin with 1 day and progress as able with long-term goal of 30 minutes 5 days a week at a minimum.     Meditation daily can help manage and control stress. Chronic stress can make weight loss difficult.  Exercising is one way to help with stress, but meditation using the CALM Felipe or another comparable alternative can be done in your home or place of work with little time commitment. This Felipe can also help work on behavior change and improve sleep. Check out the segment under Calm Masterclass and listen to The 4 Pillars of Health. A great way to begin learning about the foundation of lifestyle with practical tips to use in your every day.     Check out www.yourweightmatters.org blog for continued daily support and education along this weight loss journey!    Patient Resources:     Personal Training/Fitness Classes/Health Coaching     [facility] @ [email or web address]-center Full fitness center with group fitness and personal training. Discount available as client of [facility].  Health Coaching and Personal Training with Porsha Ken at our [facility]- individual weekly coaching with option to add personal training and small group fitness classes targeted at weight loss- [phone] and/or email @ [email or web address]  [employer] [place] [email or web address] Fitness [phone] and/or email Brittany at [email or web address]  [facility]ospit[facility] Fitness Centers with multiple locations: Fertility Focus (www.Mediasmart), Eat The Jack Robie Fitness (www.[place] World.Emergent Discovery), Fit Body Bootcamp (www.Blue Cod TechnologiesbodybootSoundTagp.Emergent Discovery), Kickball Labs (www.[place].Emergent Discovery), The Exercise  (www.exercisecoach.Emergent Discovery)     Online Fitness  Fitness  on Utube  Fit in 10 DVD series- www.huxui86AYK.com  Sit and Be Fit - Chair exercise series Www.sitandbefit.org  Hip Hop  Fit with Gene Yang at www.hiphopfit.net     Apps for on the Go Fitness  [place] 7 Minute Workout (orange box with white 7) - free on the go HIIT training felipe  Peloton Felipe @ www.onepeloton.com     Nutrition Trackers and Tools  LoseIT! And My Fitness Pal apps and on line for tracking nutrition  NOOM - virtual health coaching  FitFoundation (healthy meals on the go) in [place] @ www.ymqlygbxkawzf5x.Tabula  Bistro MD @ Adomobistromd.com and Odcmqy13 (keto and low carb plans recommended) @ www.azfxtb38.com, Metabolic Meals @ www.MyMetabolicMeals.com - individual prepared meals to go  Gobble, Blue Apron, Home , Every Plate, Sunbasket- on line meal delivery programs for preparation at home  Meal Village in [place] for homemade meals to go @ www.mealvillage.Tabula  Diet Doctor @ www.dietdoctor.Tabula - low carb swaps  YuConvertMedia - meal prep and planning felipe (www.yummly.com)     Stress Management/Behavior/Mindful Eating  CALM meditation felipe (www.calm.com)  Headspace  Am I Hungry? Mindful eating virtual  felipe  Www.yourweightmatters.org - Obesity Action Coalition sponsored Blog posts daily  Motivation felipe (black box with white \")- daily supportive messages sent to your phone     Books/Video Education/Podcasts  Mindless Eating by Agapito Chavez  Why We Get Sick by Malcolm Beckman (a book about insulin resistance)  Atomic Habits by Gerarod Watkins (a book about taking small steps to promote greater behavior change)   Can't Hurt Me by Cliff Chávez (a book exploring the power of discipline in achieving your goals)  The End of Dieting: How to Live for Life by Dr. Troy Oneal M.D. or listen to The Wine in Black Podcast Episode 63: Understanding \"Nutritarian\" Eating w/Dr. Troy Oneal  Your Body in Balance: The New Science of Food, Hormones, and Health by Dr. Baldemar Jack  The Menopause Diet Plan by Jessi Tarango and Lazara Short  The Complete Guide to fasting by Dr. Haro  Sugar, Salt & Fat by Etta Aden, Ph.D, R.D.  Weight Loss  Surgery Will Not Treat Food Addiction by Anila Childs Ph.D  The Game Changers- [place] Documentary on plant based nutrition  Fed Up - documentary about obesity (Free on Utube)  The Truth About Sugar - documentary on sugar (Free on Utube, https://youCorona Labsu.be/8L5urocXB0p)  The Dr. Patel T5 Wellness Plan by Dr. Vladimir Patel MD  Fitlosophy Fitspiration - journal to better health (found at [employer] in fitness aisle)  What Happened to You?- a look at the impact trauma has on behavior written by Whit Robledo and Dr. Benedict Sosa  Whole Again by [place] - discovering your true self after trauma  Kelsey Valdez talk on Eventbrite, The Call to Courage  Podcasts: The Exam Room by the Physician's Committee, Nutrition Facts by Dr. Faust    We are here to support you with weight loss, but please remember that you still need your primary care provider for your routine health maintenance.

## 2024-03-07 ENCOUNTER — TELEPHONE (OUTPATIENT)
Age: 24
End: 2024-03-07

## 2024-04-10 NOTE — TELEPHONE ENCOUNTER
Received fax stating ins not covering tablet form of fluoxetine and asking to send capsule form in new RX

## 2024-04-12 RX ORDER — FLUOXETINE 10 MG/1
10 CAPSULE ORAL DAILY
Qty: 90 CAPSULE | Refills: 0 | Status: SHIPPED | OUTPATIENT
Start: 2024-04-12

## 2024-07-02 ENCOUNTER — E-VISIT (OUTPATIENT)
Dept: TELEHEALTH | Age: 24
End: 2024-07-02
Payer: COMMERCIAL

## 2024-07-02 DIAGNOSIS — Z02.9 ADMINISTRATIVE ENCOUNTER: Primary | ICD-10-CM

## 2024-07-03 ENCOUNTER — TELEMEDICINE (OUTPATIENT)
Dept: TELEHEALTH | Age: 24
End: 2024-07-03
Payer: COMMERCIAL

## 2024-07-03 DIAGNOSIS — B02.9 HERPES ZOSTER WITHOUT COMPLICATION: Primary | ICD-10-CM

## 2024-07-03 PROCEDURE — 99213 OFFICE O/P EST LOW 20 MIN: CPT | Performed by: NURSE PRACTITIONER

## 2024-07-03 RX ORDER — VALACYCLOVIR HYDROCHLORIDE 1 G/1
1000 TABLET, FILM COATED ORAL EVERY 8 HOURS
Qty: 21 TABLET | Refills: 0 | Status: SHIPPED | OUTPATIENT
Start: 2024-07-03 | End: 2024-07-10

## 2024-07-03 NOTE — PROGRESS NOTES
Virtual/Telephone Check-In    Kristin Rosario verbally consents to a Virtual/Telephone Check-In service on 07/03/24.  Patient has been referred to the [facility] website at [email or web address] to review the yearly Consent to Treat document.  Patient understands and accepts financial responsibility for any deductible, co-insurance and/or co-pays associated with this service.       Telehealth Verbal Consent   I conducted a telehealth visit with Kristin Rosario today, 07/03/24, which was completed using two-way, real-time interactive audio and video communication. This has been done in good jakob to provide continuity of care in the best interest of the provider-patient relationship, due to the COVID - public health crisis/national emergency where restrictions of face-to-face office visits are ongoing. Every conscious effort was taken to allow for sufficient and adequate time to complete the visit.  The patient was made aware of the limitations of the telehealth visit, including treatment limitations as no physical exam could be performed.  The patient was advised to call 911 or to go to the ER in case there was an emergency.  The patient was also advised of the potential privacy & security concerns related to the telehealth platform.   The patient was made aware of where to find [facility]'s notice of privacy practices, telehealth consent form and other related consent forms and documents.  which are located on the [facility] website. The patient verbally agreed to telehealth consent form, related consents and the risks discussed.    Lastly, the patient confirmed that they were in [place].   Included in this visit, time may have been spent reviewing labs, medications, radiology tests and decision making. Appropriate medical decision-making and tests are ordered as detailed in the plan of care above.  Coding/billing information is submitted for this visit based on complexity of care and/or time spent for the visit.    CHIEF  COMPLAINT:     Chief Complaint   Patient presents with    Rash       HPI:   Kristin Rosario is a 23 year old female who presents for a video visit.  Patient reports rash to left flank area.    Per patient rash started in the past 2.5 days.  Patient has not had similar rash in the past. The rash is characterized by 2 areas of redness with papules that started in late afternoon on 6/30. The affected locations include left lateral trunk.   Patient has treated rash with nothing.  Associated symptoms include: + pain 3/10, + slight itching, no tingling.   No exposure to new skin/laundry products or chemicals.  Has history of chicken pox.   Under a lot of stress past few weeks at work.    Pertinent negatives include no rash drainage, anorexia, congestion, cough, diarrhea, eye pain, facial edema, fatigue, fever, joint pain, rhinorrhea, shortness of breath, sore throat or vomiting.    Current Outpatient Medications   Medication Sig Dispense Refill    lisdexamfetamine (VYVANSE) 20 MG Oral Cap Take 1 capsule (20 mg total) by mouth daily. 30 capsule 0    [START ON 7/11/2024] lisdexamfetamine (VYVANSE) 20 MG Oral Cap Take 1 capsule (20 mg total) by mouth daily. 30 capsule 0    FLUoxetine 10 MG Oral Cap Take 1 capsule (10 mg total) by mouth daily. 90 capsule 0    amoxicillin 500 MG Oral Cap Take 1 capsule (500 mg total) by mouth 3 (three) times daily.      ibuprofen 600 MG Oral Tab Take 1 tablet (600 mg total) by mouth 2 (two) times daily.      Levonorgestrel (KYLEENA IU) by Intrauterine route.        Past Medical History:    Atypical squamous cells of undetermined significance (ASCUS) on Papanicolaou smear of cervix    Bursitis    COVID-19    SEASONAL ALLERGIES      Past Surgical History:   Procedure Laterality Date    Ankle fracture surgery  2016    Retrocalcanila bursitis surgery     Iud kyleena  01/14/2020    Other  12/2016    retrocalcanial bursitis removal          Social History     Socioeconomic History    Marital status:  Single    Number of children: 0   Occupational History    Occupation: student   Tobacco Use    Smoking status: Never    Smokeless tobacco: Never   Vaping Use    Vaping status: Never Used   Substance and Sexual Activity    Alcohol use: Yes     Comment: social    Drug use: No    Sexual activity: Yes     Partners: Male     Birth control/protection: I.U.D.   Other Topics Concern     Service No    Blood Transfusions No    Caffeine Concern No    Occupational Exposure No    Hobby Hazards No    Sleep Concern No    Stress Concern No    Weight Concern No    Special Diet No    Back Care No    Exercise Yes    Bike Helmet No    Seat Belt Yes    Self-Exams No   Social History Narrative    Lives with family     Social Determinants of Health      [facility]    Social Connections    Received from [facility]         REVIEW OF SYSTEMS:   GENERAL: normal appetite  SKIN: see HPI  HEENT: See HPI  LUNGS: no shortness of breath or wheezing, See HPI  CARDIOVASCULAR: no chest pain or palpitations   NEURO: see HPI    EXAM:   General: Alert, Well-appearing, and In no acute distress  Respiratory:   Speaking in full sentences comfortably  Normal work of breathing  No cough during visit  Head: Normocephalic  Eyes: Conjunctiva clear  Nose: No obvious nasal discharge.  Skin: Left flank area with 2 patches of erythema with papules and vesicles consistent with shingles across 2 dermatomes.   Mood: Affect appropriate    ASSESSMENT AND PLAN:   Kristin Rosario is a 23 year old female who presents with symptoms that are consistent with    ASSESSMENT/PLAN:     Diagnoses and all orders for this visit:    Herpes zoster without complication  -     valACYclovir 1 G Oral Tab; Take 1 tablet (1,000 mg total) by mouth every 8 (eight) hours for 7 days.    Shingles - natural history discussed in detail including postherpetic neuralgia.  Treat with antiviral medication.  Discussed use, dose, and possible side effects   Skin care discussed with patient.  To f/u with PCP if no improvement in 3 days.    Discussed need to call PCP for severe pain at lesion sites or for any new or worsening symptoms.  Return to work when lesions are crusted  See pt instructions      Patient Instructions   Take valacyclovir as prescribed  Follow up with your PCP if no improvement in 3 days or sooner for new or worsening symptoms  Return to work when lesions are crusted.  See attached instructions    The patient indicates understanding of these issues and agrees to the plan.    Face to face time spent on Video Visit: 12:50 min  Total Time spent on visit including reviewing history, ordering labs/medication, patient examination and education: 16 min

## 2024-07-03 NOTE — PATIENT INSTRUCTIONS
Take valacyclovir as prescribed  Follow up with your PCP if no improvement in 3 days or sooner for new or worsening symptoms  Return to work when lesions are crusted.  See attached instructions

## 2024-07-04 NOTE — PROGRESS NOTES
Pt submitted evisit for rash.  Requested pt submit video visit d/t further eval needed.   Video visit completed.   No e-visit charge.

## 2024-07-29 ENCOUNTER — TELEMEDICINE (OUTPATIENT)
Dept: INTERNAL MEDICINE CLINIC | Facility: CLINIC | Age: 24
End: 2024-07-29
Payer: COMMERCIAL

## 2024-07-29 DIAGNOSIS — K62.5 BRIGHT RED BLOOD PER RECTUM: Primary | ICD-10-CM

## 2024-07-29 NOTE — PROGRESS NOTES
Kristin Rosario  7/14/2000    No chief complaint on file.  Video encounter    SUBJECTIVE   Kristin Rosario is a 24 year old female who presents for evaluation.    The patient reports a greater than 4-week duration of intermittent passing of bright red blood per rectum.  Her symptoms were previously experienced several months ago during a single episode, however has since returned with intermittent frequency.  During these episodes she passes bright red blood and with no reported pain, either rectal or abdominal.  She is unable to quantify the amount of blood, however she reports a significant volume of blood loss.  Episodes of blood per rectum are  by several days to weeks of normal bowel movements without blood.  No lightheadedness or fatigue or weakness reported.  She reports chronic abdominal bloating consistent with her history of irritable bowel syndrome.  During this time she has not been experiencing any changes with her bowel movements in terms of consistency.  No nausea or vomiting.  No reported fever.    Review of Systems   No f/c/chest pain or sob. No cough. No abd pain/n/v/d. No ha or dizziness. No numbness, tingling, or weakness. No other complaints today.    Current Outpatient Medications   Medication Sig Dispense Refill    lisdexamfetamine (VYVANSE) 20 MG Oral Cap Take 1 capsule (20 mg total) by mouth daily. 30 capsule 0    FLUoxetine 10 MG Oral Cap Take 1 capsule (10 mg total) by mouth daily. 90 capsule 0    ibuprofen 600 MG Oral Tab Take 1 tablet (600 mg total) by mouth 2 (two) times daily. (Patient not taking: Reported on 7/3/2024)      Levonorgestrel (KYLEENA IU) by Intrauterine route.        Allergies   Allergen Reactions    Codeine      Migraine    Seasonal Runny nose      Past Medical History:    Atypical squamous cells of undetermined significance (ASCUS) on Papanicolaou smear of cervix    Bursitis    COVID-19    SEASONAL ALLERGIES      Patient Active Problem List   Diagnosis     Sx.12/20/16, CPT.65220, R Exc [place] Deformity, w/, Global Exp.3/20/17    Tear of left acetabular labrum    Atypical squamous cell changes of undetermined significance (ASCUS) on cervical cytology     Anxiety    Chronic hip pain, bilateral      Past Surgical History:   Procedure Laterality Date    Ankle fracture surgery  2016    Retrocalcanila bursitis surgery     Iud kyleena  01/14/2020    Other  12/2016    retrocalcanial bursitis removal       Social History     Socioeconomic History    Marital status: Single    Number of children: 0   Occupational History    Occupation: student   Tobacco Use    Smoking status: Never    Smokeless tobacco: Never   Vaping Use    Vaping status: Never Used   Substance and Sexual Activity    Alcohol use: Yes     Comment: social    Drug use: No    Sexual activity: Yes     Partners: Male     Birth control/protection: I.U.D.   Other Topics Concern     Service No    Blood Transfusions No    Caffeine Concern No    Occupational Exposure No    Hobby Hazards No    Sleep Concern No    Stress Concern No    Weight Concern No    Special Diet No    Back Care No    Exercise Yes    Bike Helmet No    Seat Belt Yes    Self-Exams No   Social History Narrative    Lives with family     Social Determinants of Health      [facility]    Social Connections    Received from [facility]         OBJECTIVE:   Vital sign and physical evaluation not completed during this virtual encounter.    ASSESSMENT AND PLAN:   Kristin Rosario is a 24 year old female who presents for evaluation.    Outstanding screening and preventive measures:  None    Bright red blood per rectum:  CBC, CMP, PT, and APTT ordered.  If CBC findings suggest, iron studies will be added.  The patient has been referred to the GI service for further evaluation.    Total time spent: 10 minutes    The patient indicates understanding of these issues  and agrees to the plan.  TODAY'S ORDERS     No orders of the defined types were placed in this encounter.      Meds & Refills:  Requested Prescriptions      No prescriptions requested or ordered in this encounter       Imaging & Consults:  None    No follow-ups on file.  There are no Patient Instructions on file for this visit.    All questions were answered and the patient agrees with the plan.     Thank you,  Ricky Greenfield MD

## 2024-08-07 ENCOUNTER — OFFICE VISIT (OUTPATIENT)
Dept: OBGYN CLINIC | Facility: CLINIC | Age: 24
End: 2024-08-07
Payer: COMMERCIAL

## 2024-08-07 ENCOUNTER — LAB ENCOUNTER (OUTPATIENT)
Dept: LAB | Age: 24
End: 2024-08-07
Attending: INTERNAL MEDICINE
Payer: COMMERCIAL

## 2024-08-07 VITALS
HEART RATE: 92 BPM | WEIGHT: 155.13 LBS | BODY MASS INDEX: 27.49 KG/M2 | DIASTOLIC BLOOD PRESSURE: 64 MMHG | HEIGHT: 63 IN | SYSTOLIC BLOOD PRESSURE: 106 MMHG

## 2024-08-07 DIAGNOSIS — K62.5 BRIGHT RED BLOOD PER RECTUM: ICD-10-CM

## 2024-08-07 DIAGNOSIS — Z97.5 IUD (INTRAUTERINE DEVICE) IN PLACE: ICD-10-CM

## 2024-08-07 DIAGNOSIS — Z01.419 WELL WOMAN EXAM WITH ROUTINE GYNECOLOGICAL EXAM: Primary | ICD-10-CM

## 2024-08-07 LAB
ALBUMIN SERPL-MCNC: 4.4 G/DL (ref 3.2–4.8)
ALBUMIN/GLOB SERPL: 1.6 {RATIO} (ref 1–2)
ALP LIVER SERPL-CCNC: 54 U/L
ALT SERPL-CCNC: 8 U/L
ANION GAP SERPL CALC-SCNC: 3 MMOL/L (ref 0–18)
APTT PPP: 30.6 SECONDS (ref 23–36)
AST SERPL-CCNC: 16 U/L (ref ?–34)
BASOPHILS # BLD AUTO: 0.06 X10(3) UL (ref 0–0.2)
BASOPHILS NFR BLD AUTO: 0.9 %
BILIRUB SERPL-MCNC: 0.5 MG/DL (ref 0.3–1.2)
BUN BLD-MCNC: 17 MG/DL (ref 9–23)
CALCIUM BLD-MCNC: 9.5 MG/DL (ref 8.7–10.4)
CHLORIDE SERPL-SCNC: 107 MMOL/L (ref 98–112)
CO2 SERPL-SCNC: 27 MMOL/L (ref 21–32)
CREAT BLD-MCNC: 0.77 MG/DL
EGFRCR SERPLBLD CKD-EPI 2021: 109 ML/MIN/1.73M2 (ref 60–?)
EOSINOPHIL # BLD AUTO: 0.1 X10(3) UL (ref 0–0.7)
EOSINOPHIL NFR BLD AUTO: 1.4 %
ERYTHROCYTE [DISTWIDTH] IN BLOOD BY AUTOMATED COUNT: 12.6 %
FASTING STATUS PATIENT QL REPORTED: YES
GLOBULIN PLAS-MCNC: 2.7 G/DL (ref 2–3.5)
GLUCOSE BLD-MCNC: 88 MG/DL (ref 70–99)
HCT VFR BLD AUTO: 40.1 %
HGB BLD-MCNC: 13.2 G/DL
IMM GRANULOCYTES # BLD AUTO: 0.01 X10(3) UL (ref 0–1)
IMM GRANULOCYTES NFR BLD: 0.1 %
INR BLD: 1.01 (ref 0.8–1.2)
LYMPHOCYTES # BLD AUTO: 2.43 X10(3) UL (ref 1–4)
LYMPHOCYTES NFR BLD AUTO: 35.2 %
MCH RBC QN AUTO: 30.1 PG (ref 26–34)
MCHC RBC AUTO-ENTMCNC: 32.9 G/DL (ref 31–37)
MCV RBC AUTO: 91.6 FL
MONOCYTES # BLD AUTO: 0.58 X10(3) UL (ref 0.1–1)
MONOCYTES NFR BLD AUTO: 8.4 %
NEUTROPHILS # BLD AUTO: 3.73 X10 (3) UL (ref 1.5–7.7)
NEUTROPHILS # BLD AUTO: 3.73 X10(3) UL (ref 1.5–7.7)
NEUTROPHILS NFR BLD AUTO: 54 %
OSMOLALITY SERPL CALC.SUM OF ELEC: 285 MOSM/KG (ref 275–295)
PLATELET # BLD AUTO: 333 10(3)UL (ref 150–450)
POTASSIUM SERPL-SCNC: 3.8 MMOL/L (ref 3.5–5.1)
PROT SERPL-MCNC: 7.1 G/DL (ref 5.7–8.2)
PROTHROMBIN TIME: 13.3 SECONDS (ref 11.6–14.8)
RBC # BLD AUTO: 4.38 X10(6)UL
SODIUM SERPL-SCNC: 137 MMOL/L (ref 136–145)
WBC # BLD AUTO: 6.9 X10(3) UL (ref 4–11)

## 2024-08-07 PROCEDURE — 85025 COMPLETE CBC W/AUTO DIFF WBC: CPT

## 2024-08-07 PROCEDURE — 3008F BODY MASS INDEX DOCD: CPT | Performed by: NURSE PRACTITIONER

## 2024-08-07 PROCEDURE — 99395 PREV VISIT EST AGE 18-39: CPT | Performed by: NURSE PRACTITIONER

## 2024-08-07 PROCEDURE — 85610 PROTHROMBIN TIME: CPT

## 2024-08-07 PROCEDURE — 99459 PELVIC EXAMINATION: CPT | Performed by: NURSE PRACTITIONER

## 2024-08-07 PROCEDURE — 80053 COMPREHEN METABOLIC PANEL: CPT

## 2024-08-07 PROCEDURE — 3078F DIAST BP <80 MM HG: CPT | Performed by: NURSE PRACTITIONER

## 2024-08-07 PROCEDURE — 3074F SYST BP LT 130 MM HG: CPT | Performed by: NURSE PRACTITIONER

## 2024-08-07 PROCEDURE — 85730 THROMBOPLASTIN TIME PARTIAL: CPT

## 2024-08-07 PROCEDURE — 36415 COLL VENOUS BLD VENIPUNCTURE: CPT

## 2024-08-07 RX ORDER — MISOPROSTOL 200 UG/1
200 TABLET ORAL AS DIRECTED
Qty: 2 TABLET | Refills: 0 | Status: SHIPPED | OUTPATIENT
Start: 2024-08-07

## 2024-08-07 NOTE — PROGRESS NOTES
Subjective:  Chief Complaint   Patient presents with    Annual     24 year old female  presents for annual.    Patient's last menstrual period was 2024 (exact date).  Hx Prior Abnormal Pap: Yes  Pap Date: 22  Pap Result Notes: wnl  Follow Up Recommendation: Hx repeat paps  Menarche: 12 (2024  9:01 AM)  Period Cycle (Days): irregular IUD (2024  9:01 AM)  Period Duration (Days): 5 days (2024  9:01 AM)  Period Flow: moderate (2024  9:01 AM)  Use of Birth Control (if yes, specify type): Kyleena IUD (2024  9:01 AM)  Hx Prior Abnormal Pap: Yes (2024  9:01 AM)  Pap Date: 22 (2024  9:01 AM)  Pap Result Notes: wnl (2024  9:01 AM)  Follow Up Recommendation: Hx repeat paps (2024  9:01 AM)    Contraception: Kyleena IUD 2020    Denies family history of breast, ovarian and colon CA.    Feeling safe at home.    Most Recent Immunizations   Administered Date(s) Administered    Covid-19 Vaccine Moderna 100 mcg/0.5 ml 2021    DTAP 1999    FLU VAC QIV SPLIT 3 YRS AND OLDER (28766) 2019    HEP B, Ped/Adol 1998    HEP B/HIB 1998    HPV (Gardasil) 2015    IPV 1998    TDAP 2023      reports that she has never smoked. She has never used smokeless tobacco.   reports current alcohol use.    Past Medical History:    Atypical squamous cells of undetermined significance (ASCUS) on Papanicolaou smear of cervix    Bursitis    COVID-19    SEASONAL ALLERGIES     Past Surgical History:   Procedure Laterality Date    Ankle fracture surgery  2016    Retrocalcanila bursitis surgery     Hip surgery Left     Iud kyleena  2020    Other  2016    retrocalcanial bursitis removal        Review of Systems:  Pertinent items are noted in the HPI.    Objective:  /64   Pulse 92   Ht 63\"   Wt 155 lb 2 oz (70.4 kg)   LMP 2024 (Exact Date)   BMI 27.48 kg/m²    Physical Examination:  General appearance: Well dressed, well  nourished in no apparent distress  Neurologic/Psychiatric: Alert and oriented to person, place and time, mood normal, affect appropriate  Head: Normocephalic without obvious deformity, atraumatic  Neck: No thyromegaly, supple, non-tender, no masses, no adenopathy  Lungs: Clear to auscultation bilaterally, no rales, wheezes or rhonchi  Breasts: Symmetric, non-tender, no masses, lesions, retraction, dimpling or discharge bilaterally, no axillary or supraclavicular lymphadenopathy  Heart: Regular rate and rhythm, no gallops or murmurs  Abdomen: Soft, non-tender, non-distended, no masses, no hepatosplenomegaly, no hernias, no inguinal lymphadenopathy  Pelvic:    External genitalia- Normal, Bartholin's, urethra, skeins glands normal   Vagina- + IUD strings visualized, No vaginal lesions, physiologic discharge   Cervix- No lesions, long/closed, no cervical motion tenderness   Uterus- Normal sized, non-tender, no masses   Adnexa-  Non-tender, no masses  Extremities: Non-tender, full range of motion, no clubbing, cyanosis or edema  Skin:  General inspection- no rashes, lesions or discoloration      Assessment/Plan:  Normal well-woman exam.  Yearly mammogram at age 38.      Declined chaperone for exam today     Diagnoses and all orders for this visit:    Well woman exam with routine gynecological exam  - self breast exam discussed and encouraged  - ASCCP pap guidelines discussed, pap 2025    IUD (intrauterine device) in place  -     miSOPROStol 200 MCG Oral Tab; Take 1 tablet (200 mcg total) by mouth As Directed. Sublingual, night before and morning of procedure  - discussed remove and replace  - r/b/a discussed  - tylenol/motrin prior to procedure         Return in about 5 months (around 1/7/2025) for Kyleena IUD remove and replace.

## 2024-09-16 ENCOUNTER — LAB ENCOUNTER (OUTPATIENT)
Dept: LAB | Age: 24
End: 2024-09-16
Attending: INTERNAL MEDICINE
Payer: COMMERCIAL

## 2024-09-16 DIAGNOSIS — R19.7 DIARRHEA, UNSPECIFIED TYPE: ICD-10-CM

## 2024-09-16 LAB
CRP SERPL-MCNC: <0.4 MG/DL (ref ?–0.5)
IGA SERPL-MCNC: 193.2 MG/DL (ref 40–350)

## 2024-09-16 PROCEDURE — 82784 ASSAY IGA/IGD/IGG/IGM EACH: CPT

## 2024-09-16 PROCEDURE — 86364 TISS TRNSGLTMNASE EA IG CLAS: CPT

## 2024-09-16 PROCEDURE — 86140 C-REACTIVE PROTEIN: CPT

## 2024-09-16 PROCEDURE — 36415 COLL VENOUS BLD VENIPUNCTURE: CPT

## 2024-09-17 LAB — TTG IGA SER-ACNC: 0.4 U/ML (ref ?–7)

## 2024-09-19 PROBLEM — K92.1 HEMATOCHEZIA: Status: ACTIVE | Noted: 2024-09-19

## 2024-09-19 PROBLEM — R19.7 DIARRHEA, UNSPECIFIED: Status: ACTIVE | Noted: 2024-09-19

## 2024-09-19 PROCEDURE — 88305 TISSUE EXAM BY PATHOLOGIST: CPT | Performed by: INTERNAL MEDICINE

## 2024-12-27 ENCOUNTER — LAB ENCOUNTER (OUTPATIENT)
Dept: LAB | Age: 24
End: 2024-12-27
Payer: COMMERCIAL

## 2024-12-27 DIAGNOSIS — K92.1 HEMATOCHEZIA: ICD-10-CM

## 2024-12-27 LAB
BASOPHILS # BLD AUTO: 0.05 X10(3) UL (ref 0–0.2)
BASOPHILS NFR BLD AUTO: 0.7 %
EOSINOPHIL # BLD AUTO: 0.2 X10(3) UL (ref 0–0.7)
EOSINOPHIL NFR BLD AUTO: 2.7 %
ERYTHROCYTE [DISTWIDTH] IN BLOOD BY AUTOMATED COUNT: 12 %
HCT VFR BLD AUTO: 38.1 %
HGB BLD-MCNC: 12.5 G/DL
IMM GRANULOCYTES # BLD AUTO: 0.02 X10(3) UL (ref 0–1)
IMM GRANULOCYTES NFR BLD: 0.3 %
LYMPHOCYTES # BLD AUTO: 2.86 X10(3) UL (ref 1–4)
LYMPHOCYTES NFR BLD AUTO: 39.2 %
MCH RBC QN AUTO: 30.3 PG (ref 26–34)
MCHC RBC AUTO-ENTMCNC: 32.8 G/DL (ref 31–37)
MCV RBC AUTO: 92.5 FL
MONOCYTES # BLD AUTO: 0.67 X10(3) UL (ref 0.1–1)
MONOCYTES NFR BLD AUTO: 9.2 %
NEUTROPHILS # BLD AUTO: 3.49 X10 (3) UL (ref 1.5–7.7)
NEUTROPHILS # BLD AUTO: 3.49 X10(3) UL (ref 1.5–7.7)
NEUTROPHILS NFR BLD AUTO: 47.9 %
PLATELET # BLD AUTO: 318 10(3)UL (ref 150–450)
RBC # BLD AUTO: 4.12 X10(6)UL
WBC # BLD AUTO: 7.3 X10(3) UL (ref 4–11)

## 2024-12-27 PROCEDURE — 85025 COMPLETE CBC W/AUTO DIFF WBC: CPT

## 2024-12-27 PROCEDURE — 36415 COLL VENOUS BLD VENIPUNCTURE: CPT

## 2025-01-21 ENCOUNTER — OFFICE VISIT (OUTPATIENT)
Dept: OBGYN CLINIC | Facility: CLINIC | Age: 25
End: 2025-01-21
Payer: COMMERCIAL

## 2025-01-21 VITALS
SYSTOLIC BLOOD PRESSURE: 112 MMHG | HEART RATE: 91 BPM | BODY MASS INDEX: 30 KG/M2 | WEIGHT: 161.63 LBS | DIASTOLIC BLOOD PRESSURE: 70 MMHG

## 2025-01-21 DIAGNOSIS — Z01.812 ENCOUNTER FOR PREPROCEDURAL LABORATORY EXAMINATION: ICD-10-CM

## 2025-01-21 DIAGNOSIS — Z30.433 ENCOUNTER FOR REMOVAL AND REINSERTION OF IUD: Primary | ICD-10-CM

## 2025-01-21 LAB
CONTROL LINE PRESENT WITH A CLEAR BACKGROUND (YES/NO): YES YES/NO
KIT LOT #: NORMAL NUMERIC
PREGNANCY TEST, URINE: NEGATIVE

## 2025-01-21 PROCEDURE — 58301 REMOVE INTRAUTERINE DEVICE: CPT | Performed by: NURSE PRACTITIONER

## 2025-01-21 PROCEDURE — 81025 URINE PREGNANCY TEST: CPT | Performed by: NURSE PRACTITIONER

## 2025-01-21 PROCEDURE — 58300 INSERT INTRAUTERINE DEVICE: CPT | Performed by: NURSE PRACTITIONER

## 2025-01-21 PROCEDURE — 3074F SYST BP LT 130 MM HG: CPT | Performed by: NURSE PRACTITIONER

## 2025-01-21 PROCEDURE — 3078F DIAST BP <80 MM HG: CPT | Performed by: NURSE PRACTITIONER

## 2025-01-21 NOTE — PATIENT INSTRUCTIONS
[facility] [place]  After Care Instructions for IUD      Bleeding   You may experience irregular bleeding for the fist 3-6 months.  If your bleeding becomes heavier than a normal menstrual cycle, please contact our office.    Pain  You may experience mild menstrual cramping after the IUD insertion that will typically last 24-48hrs.  You may take Ibuprofen, Tylenol or Aleve to relieve the discomfort.  If you experience severe or persistent pain, please contact our office.       Restrictions    You should avoid sexual intercourse or tampon use for 1 day after insertion.  Please use a backup method of contraception for 4-7 days with the Mirena and Lulu.    When to contact our office  If you are experiencing discomfort described as worse than menstrual cramps that is not relieved by ibuprofen   Fever of 100.4 or greater  Vaginal bleeding that is saturating 1 pad per hour    Any discomfort or poking sensation during intercourse or other sexual activity      Follow up in 4-6 weeks for IUD check.   If you have additional questions or concerns, please call us at [phone].     SW consulted due to the patient needing help with getting home. Patient unable to identify any one to pick her up or an address for her to go to. Patient wakes up briefly and then falls back to sleep. SW concerned for discharge on her own due to poor mentation.

## 2025-01-21 NOTE — PROCEDURES
IUD Removal/Insertion Procedure Note    Indications: Desires contraception    Procedure Details:   Urine pregnancy test negative.  Checklist reviewed prior to insertion.  No contraindications.  The risks including infection with effects on future fertility and need for antibiotics, bleeding, expulsion, and uterine perforation with need for additional surgery and benefits of the procedure were explained to the patient and informed consent obtained.     Sterile speculum inserted and IUD strings visualized.  The cervix was prepped with betadine solution.  The uterus was sounded to 6.5 cms.  A new Kyleena IUD was inserted without complications using correct insertion technique.  The strings were trimmed to 2-3 cms.  Monsels paste applied. Hemostasis achieved. Signs and symptoms of complications reviewed and patient was given the patient information pamphlet.    Condition:  Stable    Complications:  None    Plan:  The patient was advised to call for any fever or for prolonged or severe pain or bleeding. Follow up one month for IUD check.  's information booklet given to patient.

## 2025-01-21 NOTE — PROGRESS NOTES
Subjective:  24 year old    Chief Complaint   Patient presents with    Contraception     Kyleena removal/reinsertion      Pt here today requesting IUD removal and insertion of Kyleena IUD    Review of Systems:  Pertinent items are noted in the HPI.    Objective:  LMP 2024 (Exact Date)       Physical Examination:  General appearance: Well dressed, well nourished in no apparent distress  Neurologic/Psychiatric: Alert and oriented to person, place and time, mood normal, affect appropriate  Abdomen: Soft, non-tender, non-distended, no masses, no hepatosplenomegaly, no hernias, no inguinal lymphadenopathy  Pelvic:    External genitalia- Normal, Bartholin's, urethra, skeins glands normal   Vagina- + IUD strings visualized, No vaginal lesions, physiologic discharge   Cervix- No lesions, long/closed, no cervical motion tenderness   Uterus- Normal, non-tender, no masses   Adnexa-  Non-tender, no masses    Assessment/Plan:    Diagnoses and all orders for this visit:    Encounter for removal and reinsertion of IUD  -     levonorgestrel (Kyleena) 19.5 MG intra-uterine device  -     Removal of IUD [63130]  -     Insert IUD [68419]    Encounter for preprocedural laboratory examination  -     Urine Preg Test [16542]        Return in about 4 weeks (around 2025) for IUD check.

## 2025-02-26 ENCOUNTER — OFFICE VISIT (OUTPATIENT)
Dept: OBGYN CLINIC | Facility: CLINIC | Age: 25
End: 2025-02-26
Payer: COMMERCIAL

## 2025-02-26 VITALS
BODY MASS INDEX: 27.86 KG/M2 | SYSTOLIC BLOOD PRESSURE: 100 MMHG | HEIGHT: 63 IN | HEART RATE: 104 BPM | DIASTOLIC BLOOD PRESSURE: 66 MMHG | WEIGHT: 157.25 LBS

## 2025-02-26 DIAGNOSIS — Z97.5 IUD (INTRAUTERINE DEVICE) IN PLACE: Primary | ICD-10-CM

## 2025-02-26 PROCEDURE — 3008F BODY MASS INDEX DOCD: CPT | Performed by: NURSE PRACTITIONER

## 2025-02-26 PROCEDURE — 99213 OFFICE O/P EST LOW 20 MIN: CPT | Performed by: NURSE PRACTITIONER

## 2025-02-26 PROCEDURE — 99459 PELVIC EXAMINATION: CPT | Performed by: NURSE PRACTITIONER

## 2025-02-26 PROCEDURE — 3074F SYST BP LT 130 MM HG: CPT | Performed by: NURSE PRACTITIONER

## 2025-02-26 PROCEDURE — 3078F DIAST BP <80 MM HG: CPT | Performed by: NURSE PRACTITIONER

## 2025-02-26 NOTE — PROGRESS NOTES
Subjective:  Kristin Rosario presents for follow up to Kyleena IUD insertion.    She denies any pain, fever or vaginal discharge.  She has had minimal bleeding since insertion.  Has not felt for her strings  Has had intercourse since placement, desire strings to be trimmed    Objective:  Physical Exam:   /66   Pulse 104   Ht 63\"   Wt 157 lb 4 oz (71.3 kg)   LMP 12/17/2024 (Approximate)   BMI 27.86 kg/m²    General: Comfortable, no apparent distress  Abdomen: Soft, non-tender, non-distended, no masses  Pelvic: External genitalia normal, no cervical or vaginal lesions, no discharge  Cervix: IUD string visible   Bimanual examination:  Uterus non-tender, no cervical motion tenderness, adnexa non-tender, no masses    Assessment/Plan:  Normal IUD follow up examination  Follow up as needed or for routine annual gynecologic examination    Diagnoses and all orders for this visit:    IUD (intrauterine device) in place  - IUD strings visualized  - IUD strings trimmed per pt request  - Placed 1/2025  - Remove on or before 1/2030        Return for annual well woman exam or sooner if needed.

## (undated) NOTE — LETTER
Kristin Rosario, :2000    CONSENT FOR PROCEDURE/SEDATION    1. I authorize the performance upon Kristin Rosario  the following: Removal and reinsertion of Kyleena IUD     2. I authorize SEPIDEH Paulino (and whomever is designated as the doctor’s assistant), to perform the above-mentioned procedures.    3. If any unforeseen conditions arise during this procedure calling for additional  procedures, operations, or medications (including anesthesia and blood transfusion), I further request and authorize the doctor to do whatever he/she deems advisable in my interest.    4. I consent to the taking and reproduction of any photographs in the course of this procedure for professional purposes.    5. I consent to the administration of such sedation as may be considered necessary or advisable by the physician responsible for this service, with the exception of ______________________________________________________    6. I have been informed by my doctor of the nature and purpose of this procedure sedation, possible alternative methods of treatment, risk involved and possible complications.    7. If I have a Do Not Resuscitate (DNR) order in place, the physician and I (or the individual authorized to consent on my behalf) will discuss and agree as to whether the Do Not Resuscitate (DNR) order will remain in effect or will be discontinued during the performance of the procedure and the applicable recovery period. If the Do Not Resuscitate (DNR) order is discontinued and is to be reinstated following the procedure/recovery period, the physician will determine when the applicable recovery period ends for purposes of reinstating the Do Not Resuscitate (DNR) order.    Signature of Patient:_______________________________________________    Signature of person authorized to consent for patient:  _______________________________________________________________    Relationship to patient:  ____________________________________________    Witness: _________________________________________ Date:___________     Physician Signature: _______________________________ Date:___________